# Patient Record
Sex: FEMALE | Race: WHITE | NOT HISPANIC OR LATINO | Employment: UNEMPLOYED | URBAN - NONMETROPOLITAN AREA
[De-identification: names, ages, dates, MRNs, and addresses within clinical notes are randomized per-mention and may not be internally consistent; named-entity substitution may affect disease eponyms.]

---

## 2021-10-20 ENCOUNTER — LAB REQUISITION (OUTPATIENT)
Dept: LAB | Facility: HOSPITAL | Age: 21
End: 2021-10-20

## 2021-10-20 DIAGNOSIS — Z00.00 ROUTINE GENERAL MEDICAL EXAMINATION AT A HEALTH CARE FACILITY: ICD-10-CM

## 2021-10-20 LAB — FIBRONECTIN FETAL VAG QL: NEGATIVE

## 2021-10-20 PROCEDURE — 82731 ASSAY OF FETAL FIBRONECTIN: CPT | Performed by: OBSTETRICS & GYNECOLOGY

## 2023-05-26 LAB
EXTERNAL ABO GROUPING: NORMAL
EXTERNAL ANTIBODY SCREEN: NEGATIVE
EXTERNAL HEPATITIS B SURFACE ANTIGEN: NEGATIVE
EXTERNAL HEPATITIS C AB: NORMAL
EXTERNAL RH FACTOR: POSITIVE
EXTERNAL RUBELLA QUALITATIVE: NORMAL
EXTERNAL SYPHILIS RPR SCREEN: NORMAL
HIV1 P24 AG SERPL QL IA: NORMAL

## 2023-06-03 ENCOUNTER — HOSPITAL ENCOUNTER (INPATIENT)
Facility: HOSPITAL | Age: 23
LOS: 12 days | Discharge: HOME | End: 2023-06-15
Attending: OBSTETRICS & GYNECOLOGY | Admitting: OBSTETRICS & GYNECOLOGY
Payer: MEDICAID

## 2023-06-03 ENCOUNTER — APPOINTMENT (OUTPATIENT)
Dept: ULTRASOUND IMAGING | Facility: HOSPITAL | Age: 23
End: 2023-06-03
Payer: MEDICAID

## 2023-06-03 LAB
ABO GROUP BLD: NORMAL
BASOPHILS # BLD AUTO: 0.04 10*3/MM3 (ref 0–0.2)
BASOPHILS NFR BLD AUTO: 0.2 % (ref 0–1.5)
BLD GP AB SCN SERPL QL: NEGATIVE
BURR CELLS BLD QL SMEAR: NORMAL
CLUMPED PLATELETS: PRESENT
DEPRECATED RDW RBC AUTO: 42.5 FL (ref 37–54)
ELLIPTOCYTES BLD QL SMEAR: NORMAL
EOSINOPHIL # BLD AUTO: 0.02 10*3/MM3 (ref 0–0.4)
EOSINOPHIL NFR BLD AUTO: 0.1 % (ref 0.3–6.2)
ERYTHROCYTE [DISTWIDTH] IN BLOOD BY AUTOMATED COUNT: 15.9 % (ref 12.3–15.4)
HCT VFR BLD AUTO: 27.6 % (ref 34–46.6)
HGB BLD-MCNC: 8.2 G/DL (ref 12–15.9)
IMM GRANULOCYTES # BLD AUTO: 0.28 10*3/MM3 (ref 0–0.05)
IMM GRANULOCYTES NFR BLD AUTO: 1.6 % (ref 0–0.5)
LYMPHOCYTES # BLD AUTO: 1.98 10*3/MM3 (ref 0.7–3.1)
LYMPHOCYTES NFR BLD AUTO: 11.3 % (ref 19.6–45.3)
MCH RBC QN AUTO: 22.1 PG (ref 26.6–33)
MCHC RBC AUTO-ENTMCNC: 29.7 G/DL (ref 31.5–35.7)
MCV RBC AUTO: 74.4 FL (ref 79–97)
MICROCYTES BLD QL: NORMAL
MONOCYTES # BLD AUTO: 0.53 10*3/MM3 (ref 0.1–0.9)
MONOCYTES NFR BLD AUTO: 3 % (ref 5–12)
NEUTROPHILS NFR BLD AUTO: 14.61 10*3/MM3 (ref 1.7–7)
NEUTROPHILS NFR BLD AUTO: 83.8 % (ref 42.7–76)
NRBC BLD AUTO-RTO: 0.3 /100 WBC (ref 0–0.2)
PLATELET # BLD AUTO: 408 10*3/MM3 (ref 140–450)
PMV BLD AUTO: 10.3 FL (ref 6–12)
POLYCHROMASIA BLD QL SMEAR: NORMAL
RBC # BLD AUTO: 3.71 10*6/MM3 (ref 3.77–5.28)
RH BLD: POSITIVE
SMALL PLATELETS BLD QL SMEAR: ADEQUATE
T&S EXPIRATION DATE: NORMAL
WBC MORPH BLD: NORMAL
WBC NRBC COR # BLD: 17.46 10*3/MM3 (ref 3.4–10.8)

## 2023-06-03 PROCEDURE — 86901 BLOOD TYPING SEROLOGIC RH(D): CPT | Performed by: OBSTETRICS & GYNECOLOGY

## 2023-06-03 PROCEDURE — 25010000002 AMPICILLIN PER 500 MG: Performed by: OBSTETRICS & GYNECOLOGY

## 2023-06-03 PROCEDURE — 86900 BLOOD TYPING SEROLOGIC ABO: CPT | Performed by: OBSTETRICS & GYNECOLOGY

## 2023-06-03 PROCEDURE — 85007 BL SMEAR W/DIFF WBC COUNT: CPT | Performed by: OBSTETRICS & GYNECOLOGY

## 2023-06-03 PROCEDURE — 76815 OB US LIMITED FETUS(S): CPT

## 2023-06-03 PROCEDURE — 86850 RBC ANTIBODY SCREEN: CPT | Performed by: OBSTETRICS & GYNECOLOGY

## 2023-06-03 PROCEDURE — 85025 COMPLETE CBC W/AUTO DIFF WBC: CPT | Performed by: OBSTETRICS & GYNECOLOGY

## 2023-06-03 PROCEDURE — 25010000002 AZITHROMYCIN PER 500 MG: Performed by: OBSTETRICS & GYNECOLOGY

## 2023-06-03 RX ORDER — PRENATAL VIT/IRON FUM/FOLIC AC 27MG-0.8MG
1 TABLET ORAL DAILY
COMMUNITY

## 2023-06-03 RX ORDER — SODIUM CHLORIDE, SODIUM LACTATE, POTASSIUM CHLORIDE, CALCIUM CHLORIDE 600; 310; 30; 20 MG/100ML; MG/100ML; MG/100ML; MG/100ML
125 INJECTION, SOLUTION INTRAVENOUS CONTINUOUS
Status: DISCONTINUED | OUTPATIENT
Start: 2023-06-03 | End: 2023-06-13

## 2023-06-03 RX ORDER — BETAMETHASONE SODIUM PHOSPHATE AND BETAMETHASONE ACETATE 3; 3 MG/ML; MG/ML
12 INJECTION, SUSPENSION INTRA-ARTICULAR; INTRALESIONAL; INTRAMUSCULAR; SOFT TISSUE EVERY 24 HOURS
Status: DISPENSED | OUTPATIENT
Start: 2023-06-03 | End: 2023-06-05

## 2023-06-03 RX ADMIN — AZITHROMYCIN 500 MG: 500 INJECTION, POWDER, LYOPHILIZED, FOR SOLUTION INTRAVENOUS at 18:41

## 2023-06-03 RX ADMIN — AMPICILLIN 2 G: 2 INJECTION, POWDER, FOR SOLUTION INTRAMUSCULAR; INTRAVENOUS at 21:36

## 2023-06-03 RX ADMIN — SODIUM CHLORIDE, POTASSIUM CHLORIDE, SODIUM LACTATE AND CALCIUM CHLORIDE 125 ML/HR: 600; 310; 30; 20 INJECTION, SOLUTION INTRAVENOUS at 17:00

## 2023-06-04 ENCOUNTER — APPOINTMENT (OUTPATIENT)
Dept: ULTRASOUND IMAGING | Facility: HOSPITAL | Age: 23
End: 2023-06-04
Payer: MEDICAID

## 2023-06-04 LAB
AMPHET+METHAMPHET UR QL: NEGATIVE
AMPHETAMINES UR QL: NEGATIVE
BARBITURATES UR QL SCN: NEGATIVE
BENZODIAZ UR QL SCN: NEGATIVE
BILIRUB UR QL STRIP: NEGATIVE
BUPRENORPHINE SERPL-MCNC: NEGATIVE NG/ML
CANNABINOIDS SERPL QL: NEGATIVE
CLARITY UR: CLEAR
CLUE CELLS SPEC QL WET PREP: ABNORMAL
COCAINE UR QL: NEGATIVE
COLOR UR: YELLOW
FENTANYL UR-MCNC: NEGATIVE NG/ML
GLUCOSE UR STRIP-MCNC: NEGATIVE MG/DL
HGB UR QL STRIP.AUTO: NEGATIVE
HYDATID CYST SPEC WET PREP: ABNORMAL
KETONES UR QL STRIP: NEGATIVE
LEUKOCYTE ESTERASE UR QL STRIP.AUTO: NEGATIVE
METHADONE UR QL SCN: NEGATIVE
NITRITE UR QL STRIP: NEGATIVE
OPIATES UR QL: NEGATIVE
OXYCODONE UR QL SCN: NEGATIVE
PCP UR QL SCN: NEGATIVE
PH UR STRIP.AUTO: 7 [PH] (ref 5–8)
PROPOXYPH UR QL: NEGATIVE
PROT UR QL STRIP: NEGATIVE
SP GR UR STRIP: 1.01 (ref 1–1.03)
T VAGINALIS SPEC QL WET PREP: ABNORMAL
TRICYCLICS UR QL SCN: NEGATIVE
UROBILINOGEN UR QL STRIP: NORMAL
WBC SPEC QL WET PREP: ABNORMAL
YEAST GENITAL QL WET PREP: ABNORMAL

## 2023-06-04 PROCEDURE — 87210 SMEAR WET MOUNT SALINE/INK: CPT | Performed by: OBSTETRICS & GYNECOLOGY

## 2023-06-04 PROCEDURE — 25010000002 AMPICILLIN PER 500 MG: Performed by: OBSTETRICS & GYNECOLOGY

## 2023-06-04 PROCEDURE — 63710000001 PROMETHAZINE PER 25 MG: Performed by: OBSTETRICS & GYNECOLOGY

## 2023-06-04 PROCEDURE — 80307 DRUG TEST PRSMV CHEM ANLYZR: CPT | Performed by: OBSTETRICS & GYNECOLOGY

## 2023-06-04 PROCEDURE — 76815 OB US LIMITED FETUS(S): CPT

## 2023-06-04 PROCEDURE — 25010000002 BETAMETHASONE ACET & SOD PHOS PER 4 MG: Performed by: OBSTETRICS & GYNECOLOGY

## 2023-06-04 PROCEDURE — 87086 URINE CULTURE/COLONY COUNT: CPT | Performed by: OBSTETRICS & GYNECOLOGY

## 2023-06-04 PROCEDURE — 25010000002 AZITHROMYCIN PER 500 MG: Performed by: OBSTETRICS & GYNECOLOGY

## 2023-06-04 PROCEDURE — 81003 URINALYSIS AUTO W/O SCOPE: CPT | Performed by: OBSTETRICS & GYNECOLOGY

## 2023-06-04 RX ORDER — PROMETHAZINE HYDROCHLORIDE 25 MG/1
12.5 TABLET ORAL ONCE
Status: COMPLETED | OUTPATIENT
Start: 2023-06-04 | End: 2023-06-04

## 2023-06-04 RX ADMIN — AMPICILLIN 2 G: 2 INJECTION, POWDER, FOR SOLUTION INTRAMUSCULAR; INTRAVENOUS at 09:05

## 2023-06-04 RX ADMIN — SODIUM CHLORIDE, POTASSIUM CHLORIDE, SODIUM LACTATE AND CALCIUM CHLORIDE 125 ML/HR: 600; 310; 30; 20 INJECTION, SOLUTION INTRAVENOUS at 18:44

## 2023-06-04 RX ADMIN — AMPICILLIN 2 G: 2 INJECTION, POWDER, FOR SOLUTION INTRAMUSCULAR; INTRAVENOUS at 03:20

## 2023-06-04 RX ADMIN — SODIUM CHLORIDE, POTASSIUM CHLORIDE, SODIUM LACTATE AND CALCIUM CHLORIDE 125 ML/HR: 600; 310; 30; 20 INJECTION, SOLUTION INTRAVENOUS at 07:00

## 2023-06-04 RX ADMIN — AMPICILLIN 2 G: 2 INJECTION, POWDER, FOR SOLUTION INTRAMUSCULAR; INTRAVENOUS at 20:38

## 2023-06-04 RX ADMIN — PROMETHAZINE HYDROCHLORIDE 12.5 MG: 25 TABLET ORAL at 20:37

## 2023-06-04 RX ADMIN — AMPICILLIN 2 G: 2 INJECTION, POWDER, FOR SOLUTION INTRAMUSCULAR; INTRAVENOUS at 14:57

## 2023-06-04 RX ADMIN — AZITHROMYCIN 500 MG: 500 INJECTION, POWDER, LYOPHILIZED, FOR SOLUTION INTRAVENOUS at 18:44

## 2023-06-04 RX ADMIN — BETAMETHASONE SODIUM PHOSPHATE AND BETAMETHASONE ACETATE 12 MG: 3; 3 INJECTION, SUSPENSION INTRA-ARTICULAR; INTRALESIONAL; INTRAMUSCULAR at 16:17

## 2023-06-04 NOTE — NURSING NOTE
Ultrasound tech at bedside to get measurement of ARCELIA, U/S shows AFT to be 7.44, Infant is vertex and fetal heart tones to be 125.

## 2023-06-04 NOTE — PLAN OF CARE
Goal Outcome Evaluation:  Plan of Care Reviewed With: patient        Progress: improving  Outcome Evaluation: [place] [facility] [place] 6/3/2023 at 1210, clear fluid.  Betamethasone x 1 received on 6/3/2023 at 1600, next dose due at 1600 on 6/4/2023.  Patient receiving IV Zithromax Q24 hours and IV Ampicillin 2g Q6 hours.  Patient currently on clear liquid diet.  Patient rating pain 0/10.  Repeat US today to verify ARCELIA.  Patient's significant other at bedside.

## 2023-06-04 NOTE — PROGRESS NOTES
Rosalia Guajardo  : 2000  MRN: [id number]  CSN: [id number]    Labor progress note    Subjective   Transfer from [place] for PPROM at 32 weeks. Minimal prenatal care with only 2 prenatal visits, the first being 1 month ago. Minimal leakage of fluid, ARCELIA last night 11, this morning 7. No evidence of  labor. Discussed with patient and FOB long term plan of care. Explained with PPROM, patient will not be able to leave the floor to smoke, offered a nicotine patch. Begun on prophylactic antibiotics to increase latency period to labor. Will send wet prep to ensure no BV as current antibiotic regimen does not provide coverage for BV. Cath urine collected as well.     Objective   Min/max vitals past 24 hours:  Temp  Min: 97.6 °F (36.4 °C)  Max: 98.5 °F (36.9 °C)   BP  Min: 102/44  Max: 131/57   Pulse  Min: 73  Max: 85   Resp  Min: 14  Max: 18        FHT's: reactive and category 1.  external monitors used   Cervix: was checked (by RN): 2 cm / 80 % / -2   Contractions: none      Assessment   IUP at 32w6d  PPROM  Poor prenatal care  Smoker     Plan   Expectant management  Antibiotic prophylaxis  Steroids  Cath UA and wet prep  UDS  Nicotine patch prn    Brandy Tellez MD  2023  09:11 CDT

## 2023-06-05 LAB — BACTERIA SPEC AEROBE CULT: NO GROWTH

## 2023-06-05 PROCEDURE — 25010000002 AMPICILLIN PER 500 MG: Performed by: OBSTETRICS & GYNECOLOGY

## 2023-06-05 PROCEDURE — 25010000002 AZITHROMYCIN PER 500 MG: Performed by: OBSTETRICS & GYNECOLOGY

## 2023-06-05 PROCEDURE — 25010000002 ONDANSETRON PER 1 MG: Performed by: OBSTETRICS & GYNECOLOGY

## 2023-06-05 RX ORDER — ONDANSETRON 2 MG/ML
4 INJECTION INTRAMUSCULAR; INTRAVENOUS EVERY 6 HOURS PRN
Status: DISCONTINUED | OUTPATIENT
Start: 2023-06-05 | End: 2023-06-13

## 2023-06-05 RX ADMIN — AMPICILLIN 2 G: 2 INJECTION, POWDER, FOR SOLUTION INTRAMUSCULAR; INTRAVENOUS at 02:48

## 2023-06-05 RX ADMIN — SODIUM CHLORIDE, POTASSIUM CHLORIDE, SODIUM LACTATE AND CALCIUM CHLORIDE 125 ML/HR: 600; 310; 30; 20 INJECTION, SOLUTION INTRAVENOUS at 14:52

## 2023-06-05 RX ADMIN — AZITHROMYCIN 500 MG: 500 INJECTION, POWDER, LYOPHILIZED, FOR SOLUTION INTRAVENOUS at 19:06

## 2023-06-05 RX ADMIN — AMPICILLIN SODIUM 2 G: 2 INJECTION, POWDER, FOR SOLUTION INTRAVENOUS at 20:40

## 2023-06-05 RX ADMIN — ONDANSETRON 4 MG: 2 INJECTION INTRAMUSCULAR; INTRAVENOUS at 16:18

## 2023-06-05 NOTE — PLAN OF CARE
Problem: Adult Inpatient Plan of Care  Goal: Plan of Care Review  Outcome: Ongoing, Progressing  Flowsheets  Taken 6/5/2023 1832 by Terry Hernandez, RN  Progress: no change  Plan of Care Reviewed With: patient  Taken 6/5/2023 0405 by Jessica Chin, RN  Outcome Evaluation: PPROM on 6/3/2023 at 1210, clear fluid.  Patient receiving IV Ampicillin 2g F6evdzh and IV Zithromax Q24hrs.  Patient has had bethamethasone x 2 doses.  Patient rating pain 0/10.   Goal Outcome Evaluation:  Plan of Care Reviewed With: patient        Progress: no change

## 2023-06-05 NOTE — NURSING NOTE
Pt crying in the room states she is overwhelmed thinking she will have to stay in the hospital for weeks. Provided comfort and discussed ways to get her mind off being in the bed and hospital until delivery

## 2023-06-05 NOTE — PLAN OF CARE
Goal Outcome Evaluation:  Plan of Care Reviewed With: patient        Progress: improving  Outcome Evaluation: PPROM on 6/3/2023 at 1210, clear fluid.  Patient receiving IV Ampicillin 2g T6wyylj and IV Zithromax Q24hrs.  Patient has had bethamethasone x 2 doses.  Patient rating pain 0/10.

## 2023-06-05 NOTE — CONSULTS
PRENATAL CONSULTATION NOTE     DATE: 2023  MRN: [id number]      Patient Name: Lacey Guajardo  YOB: 2000    Active Problems:    * No active hospital problems. *      Requesting Physician:  LUIS Tellez    EDC: 2023, by Patient Reported    GA: 33w0d    Estimated fetal weight: unknown    Reason for Consultation: potential premature single at 33 weeks gestation    Maternal History: 22 y.o.  with  labor, PPROM since 12pm 6/3/23, tobacco use, limited prenatal care      Medications:  No recently discontinued medications to reconcile    Consult:  mother available for discussion.  We reviewed the fetal and  aspects of the above conditions to include: estimated survival rate, respiratory distress syndrome, transient tachypnea of the , beneficial effects of steroids, early onset of sepsis, apnea of prematurity, anemia of prematurity, feeding difficulty, temperature instability, jaundice, and hypoglycemia    Plan for Resuscitation: full resuscitation      Offered estimation of prognosis of potential length of infant hospitalization.     We will plan to attend delivery when requested. I discussed the importance of providing human milk for all infants, especially premature infants. The patient does plan on providing pumped breast milk and/or nursing when appropriate.    I also reviewed policies and procedures for NICU/ICN admission and reviewed structure and function of [facility] - Neonatology at [facility].    Thank you for allowing us to participate in the care of this patient. [facility] is always available for follow-up consultation as indicated. Please do not hesitate to call for additional questions or issues.    Additional Comments: none    Papito Barber MD  Attending Neonatologist  [facility] - Neonatology  [facility]  23 @ 14:09 CDT    Consult note reviewed and electronically signed on 2023 at 14:09 CDT    INITIAL HOSPITAL CONSULT:   A total of 45 minutes were spent on counseling and coordination of care including discussion with physicians and nursing, and reviewing the findings and recommendations with the patient and her family of which 30 minutes were spent face-to-face with the patient during this encounter.    Thank you for requesting this consult. Please contact the [facility] on-call  Provider for any further questions/concerns, by calling the NICU at extension [phone].        DISCLAIMER:        At [facility], we believe that sharing information builds trust and better relationships. You are receiving this note because you are receiving care at [facility] or recently visited. It is possible you will see health information before a provider has talked with you about it. This kind of information can be easy to misunderstand. To help you fully understand what it means for your health, we urge you to discuss this note with your provider.

## 2023-06-05 NOTE — PROGRESS NOTES
Rosalia Guajardo  : 2000  MRN: [id number]  CSN: [id number]    Labor progress note    Subjective   Minimal leakage since admit. No evidence of infection or  labor     Objective   Min/max vitals past 24 hours:  Temp  Min: 97.1 °F (36.2 °C)  Max: 98.2 °F (36.8 °C)   BP  Min: 98/46  Max: 127/63   Pulse  Min: 61  Max: 91   Resp  Min: 16  Max: 17        FHT's: reassuring and category 1.  external monitors used   Cervix: was not checked. today   Contractions: none      Assessment   IUP at 33w0d  PPROM     Plan   Expectant management    Brandy Tellez MD  2023  08:01 CDT

## 2023-06-06 PROCEDURE — 25010000002 ONDANSETRON PER 1 MG: Performed by: OBSTETRICS & GYNECOLOGY

## 2023-06-06 PROCEDURE — 25010000002 AZITHROMYCIN PER 500 MG: Performed by: OBSTETRICS & GYNECOLOGY

## 2023-06-06 PROCEDURE — 25010000002 AMPICILLIN PER 500 MG: Performed by: OBSTETRICS & GYNECOLOGY

## 2023-06-06 RX ADMIN — AMPICILLIN SODIUM 2 G: 2 INJECTION, POWDER, FOR SOLUTION INTRAVENOUS at 15:48

## 2023-06-06 RX ADMIN — AMPICILLIN SODIUM 2 G: 2 INJECTION, POWDER, FOR SOLUTION INTRAVENOUS at 21:12

## 2023-06-06 RX ADMIN — ONDANSETRON 4 MG: 2 INJECTION INTRAMUSCULAR; INTRAVENOUS at 17:35

## 2023-06-06 RX ADMIN — AZITHROMYCIN DIHYDRATE 500 MG: 500 INJECTION, POWDER, LYOPHILIZED, FOR SOLUTION INTRAVENOUS at 17:54

## 2023-06-06 RX ADMIN — SODIUM CHLORIDE, POTASSIUM CHLORIDE, SODIUM LACTATE AND CALCIUM CHLORIDE 125 ML/HR: 600; 310; 30; 20 INJECTION, SOLUTION INTRAVENOUS at 00:26

## 2023-06-06 RX ADMIN — AMPICILLIN SODIUM 2 G: 2 INJECTION, POWDER, FOR SOLUTION INTRAVENOUS at 02:20

## 2023-06-06 RX ADMIN — AMPICILLIN SODIUM 2 G: 2 INJECTION, POWDER, FOR SOLUTION INTRAVENOUS at 08:32

## 2023-06-06 NOTE — PAYOR COMM NOTE
"REF:   [id number]    [facility]  RYAN,CM  [phone]  OR  FAX  [phone]    Loni Guajardo (22 y.o. Female)       Date of Birth   2000    Social Security Number       Address   [address] [place] [address] [address] [facility] [address]    Home Phone   [phone]    MRN   [id number]       Judaism   Unknown    Marital Status   Single                            Admission Date   6/3/23    Admission Type   Elective    Admitting Provider   Brandy Tellez MD    Attending Provider   Brandy Tellez MD    Department, Room/Bed   [facility] LABOR DELIVERY, L04/1       Discharge Date       Discharge Disposition       Discharge Destination                                 Attending Provider: Brandy Tellez MD    Allergies: No Known Allergies    Isolation: None   Infection: None   Code Status: Not on file    Ht: 162.6 cm (64\")   Wt: 71.7 kg (158 lb)    Admission Cmt: None   Principal Problem: None                  Active Insurance as of 6/3/2023       Primary Coverage       Payor Plan Insurance Group Employer/Plan Group    [place] [id number]       Payor Plan Address Payor Plan Phone Number Payor Plan Fax Number Effective Dates    [employer] [address] [address] [phone]  1/1/2021 - None Entered    [facility] [address]         Subscriber Name Subscriber Birth Date Member ID       LONI GUAJARDO 2000 [id number]                     Emergency Contacts        (Rel.) Home Phone Work Phone Mobile Phone    [employer] (Other) [phone] -- --           6/3/2023   TRANSFER PER EMS FROM  [facility] TO LABOR AND DELIVERY DUE TO PREMATURE     RUPTURE OF MEMBRANES   EDC 7/24/2023    33/1 GESTATIONAL AGE     G-5 P-2       Miladys Sanchez, RN   Registered Nurse  Obstetrics     Nursing Note      Signed     Date of Service: 06/03/23 1705  Creation Time: 06/03/23 1821     Signed         Patient presents to [facility] for " Premature Rupture of Membranes.                Jessica Chin, RN   Registered Nurse  Obstetrics     Plan of Care      Signed     Date of Service: 06/04/23 0444  Creation Time: 06/04/23 0444     Signed         Goal Outcome Evaluation:  Plan of Care Reviewed With: patient  Progress: improving  Outcome Evaluation: [place] [facility] [place] on 6/3/2023 at 1210, clear fluid.  Betamethasone x 1 received on 6/3/2023 at 1600, next dose due at 1600 on 6/4/2023.  Patient receiving IV Zithromax Q24 hours and IV Ampicillin 2g Q6 hours.  Patient currently on clear liquid diet.  Patient rating pain 0/10.  Repeat US today to verify ARCELIA.  Patient's significant other at bedside.                     Miladys Sanchez RN   Registered Nurse  Obstetrics     Nursing Note      Signed     Date of Service: 06/04/23 0742  Creation Time: 06/04/23 0811     Signed         Ultrasound tech at bedside to get measurement of ARCELIA, U/S shows AFT to be 7.44, Infant is vertex and fetal heart tones to be 125.               Jessica Chin RN   Registered Nurse  Obstetrics     Plan of Care      Signed     Date of Service: 06/05/23 0407  Creation Time: 06/05/23 0407     Signed         Goal Outcome Evaluation:  Plan of Care Reviewed With: patient  Progress: improving  Outcome Evaluation: PPROM on 6/3/2023 at 1210, clear fluid.  Patient receiving IV Ampicillin 2g H3adrdx and IV Zithromax Q24hrs.  Patient has had bethamethasone x 2 doses.  Patient rating pain 0/10.                       Terry Hernandez RN   Registered Nurse     Plan of Care      Signed     Date of Service: 06/05/23 1836  Creation Time: 06/05/23 1836     Signed            Problem: Adult Inpatient Plan of Care  Goal: Plan of Care Review  Outcome: Ongoing, Progressing  Flowsheets  Taken 6/5/2023 1832 by Terry Hernandez, RN  Progress: no change  Plan of Care Reviewed With: patient  Taken 6/5/2023 0405 by Jessica Chin RN  Outcome Evaluation: PPROM on 6/3/2023 at 1210, clear fluid.  Patient  receiving IV Ampicillin 2g P7xhwpg and IV Zithromax Q24hrs.  Patient has had bethamethasone x 2 doses.  Patient rating pain 0/10.   Goal Outcome Evaluation:  Plan of Care Reviewed With: patient  Progress: no change                    Erinn Cevallos RN   Registered Nurse  Obstetrics     Plan of Care      Signed     Date of Service: 23  Creation Time: 23     Signed         Goal Outcome Evaluation:  Plan of Care Reviewed With: patient  Progress: no change  Outcome Evaluation:  PPROM on 6/3 clear fluid; currently receiving ampicillin 2g q6h, zithromax 500mg daily; pt remains afebrile; pain 0/10                       Facility-Administered Medications as of 2023   Medication Dose Route Frequency Provider Last Rate Last Admin    [COMPLETED] ampicillin 2 gm IVPB in 100 ml NS (MBP)  2 g Intravenous Q6H Brandy Tellez  mL/hr at 23 0248 2 g at 23 0248    ampicillin 2 gm IVPB in 100 ml NS (MBP)  2 g Intravenous Q6H Brandy Tellez  mL/hr at 23 0832 2 g at 23 0832    [COMPLETED] azithromycin (ZITHROMAX) 500 mg in sodium chloride 0.9 % 250 mL IVPB-VTB  500 mg Intravenous Q24H Brandy Tellez MD   500 mg at 23 1906    azithromycin (ZITHROMAX) 500 mg in sodium chloride 0.9 % 250 mL IVPB-VTB  500 mg Intravenous Q24H Brandy Tellez MD        [] betamethasone acetate-betamethasone sodium phosphate (CELESTONE SOLUSPAN) injection 12 mg  12 mg Intramuscular Q24H Brandy Tellez MD   12 mg at 23 1617    lactated ringers infusion  125 mL/hr Intravenous Continuous Brandy Tellez  mL/hr at 23 0026 125 mL/hr at 23 0026    ondansetron (ZOFRAN) injection 4 mg  4 mg Intravenous Q6H PRN Brandy Tellez MD   4 mg at 23 1618    [COMPLETED] promethazine (PHENERGAN) tablet 12.5 mg  12.5 mg Oral Once Tellez, Brandy Maira, MD   12.5 mg at 23     Orders (last 72 hrs)         Start     Ordered    06/06/23 1800  azithromycin (ZITHROMAX) 500 mg in sodium chloride 0.9 % 250 mL IVPB-VTB  Every 24 Hours         06/05/23 1939 06/05/23 2000  ampicillin 2 gm IVPB in 100 ml NS (MBP)  Every 6 Hours         06/05/23 1939 06/05/23 1555  ondansetron (ZOFRAN) injection 4 mg  Every 6 Hours PRN         06/05/23 1555    06/05/23 1156  Case Management  Consult  Once        Provider:  (Not yet assigned)    06/05/23 1157    06/05/23 1137  Pt can come off the monitors to shower every morning.  Misc Nursing Order (Specify)  Once        Comments: Pt can come off the monitors to shower every morning.    06/05/23 1137    06/05/23 0743  Inpatient Consult to Neonatology  Once        Specialty:  Neonatology  Provider:  (Not yet assigned)    06/05/23 0742    06/05/23 0730  Inpatient Consult to Neonatology  Once,   Status:  Canceled        Specialty:  Neonatology  Provider:  (Not yet assigned)    06/05/23 0731    06/04/23 2030  promethazine (PHENERGAN) tablet 12.5 mg  Once         06/04/23 1938 06/04/23 1031  Diet: Regular/House Diet; Texture: Regular Texture (IDDSI 7); Fluid Consistency: Thin (IDDSI 0)  Diet Effective Now         06/04/23 1030    06/04/23 0940  Urine Culture - Urine, Straight Cath  Once         06/04/23 0939    06/04/23 0932  Fentanyl, Urine - Urine, Clean Catch  Once         06/04/23 0931    06/04/23 0918  Urine Drug Screen - Urine, Clean Catch  Once         06/04/23 0917    06/04/23 0902  Urinalysis With Culture If Indicated - Straight Cath  STAT         06/04/23 0901    06/04/23 0902  Wet Prep, Genital - Swab, Vagina  STAT         06/04/23 0901    06/04/23 0600  Repeat Nitrazine and fern in the morning to confirm positive status.  Misc Nursing Order (Specify)  Once        Comments: Repeat Nitrazine and fern in the morning to confirm positive status.    06/03/23 2235 06/04/23 0600  US Ob Limited 1 + Fetuses  1 Time Imaging         06/03/23 2235 06/03/23 2100   ampicillin 2 gm IVPB in 100 ml NS (MBP)  Every 6 Hours         06/03/23 1737    06/03/23 1912  US Ob Limited 1 + Fetuses  1 Time Imaging         06/03/23 1741 06/03/23 1900  azithromycin (ZITHROMAX) 500 mg in sodium chloride 0.9 % 250 mL IVPB-VTB  Every 24 Hours         06/03/23 1737    06/03/23 1815  lactated ringers infusion  Continuous         06/03/23 1816    06/03/23 1801  Scan Slide  Once         06/03/23 1800    06/03/23 1800  betamethasone acetate-betamethasone sodium phosphate (CELESTONE SOLUSPAN) injection 12 mg  Every 24 Hours         06/03/23 1737    06/03/23 1742  US Ob 14 + Weeks Single or First Gestation  1 Time Imaging,   Status:  Canceled         06/03/23 1741 06/03/23 1737  Diet: Liquid Diets; Clear Liquid; Texture: Regular Texture (IDDSI 7); Fluid Consistency: Thin (IDDSI 0)  Diet Effective Now,   Status:  Canceled         06/03/23 1737    06/03/23 1737  CBC & Differential  STAT         06/03/23 1737    06/03/23 1737  Type & Screen  STAT         06/03/23 1737    06/03/23 1737  US Ob 14 + Weeks Single or First Gestation  1 Time Imaging,   Status:  Canceled         06/03/23 1737    06/03/23 1737  CBC Auto Differential  PROCEDURE ONCE         06/03/23 1737    06/03/23 0000  Antibody Screen        Comments: This is an external result entered through the Results Console.      06/03/23 1724 06/03/23 0000  ABO / Rh        Comments: This is an external result entered through the Results Console.      06/03/23 1724    06/03/23 0000  Hepatitis C Antibody        Comments: This is an external result entered through the Results Console.      06/03/23 1724    06/03/23 0000  Hepatitis B Surface Antigen        Comments: This is an external result entered through the Results Console.      06/03/23 1724    06/03/23 0000  RPR        Comments: This is an external result entered through the Results Console.      06/03/23 1724    06/03/23 0000  Rubella Antibody, IgG        Comments: This is an external result  entered through the Results Console.      23 1724    23 0000  HIV-1 Antibody, EIA        Comments: This is an external result entered through the Results Console.      23 1724    --  Prenatal Vit-Fe Fumarate-FA (prenatal vitamin 27-0.8) 27-0.8 MG tablet tablet  Daily         23 1709                     Physician Progress Notes (last 72 hours)        Brandy Tellez MD at 23 0801          [facility]  [facility] 2000  MRN: [id number]  CSN: [id number]    Labor progress note    Subjective   Minimal leakage since admit. No evidence of infection or  labor    Objective   Min/max vitals past 24 hours:  Temp  Min: 97.1 °F (36.2 °C)  Max: 98.2 °F (36.8 °C)   BP  Min: 98/46  Max: 127/63   Pulse  Min: 61  Max: 91   Resp  Min: 16  Max: 17        FHT's: reassuring and category 1.  external monitors used   Cervix: was not checked. today   Contractions: none     Assessment   IUP at 33w0d  PPROM    Plan   Expectant management    Brandy Tellez MD  2023  08:01 CDT      Electronically signed by Brandy Tellez MD at 23 0802       Brandy Tellez MD at 23 0909          [facility]  Lacey Bennett  : 2000  MRN: [id number]  CSN: [id number]    Labor progress note    Subjective   Transfer from [place] for PPROM at 32 weeks. Minimal prenatal care with only 2 prenatal visits, the first being 1 month ago. Minimal leakage of fluid, ARCELIA last night 11, this morning 7. No evidence of  labor. Discussed with patient and FOB long term plan of care. Explained with PPROM, patient will not be able to leave the floor to smoke, offered a nicotine patch. Begun on prophylactic antibiotics to increase latency period to labor. Will send wet prep to ensure no BV as current antibiotic regimen does not provide coverage for BV. Cath urine collected as well.    Objective   Min/max vitals past 24 hours:  Temp  Min: 97.6 °F (36.4 °C)  Max: 98.5 °F  (36.9 °C)   BP  Min: 102/44  Max: 131/57   Pulse  Min: 73  Max: 85   Resp  Min: 14  Max: 18        FHT's: reactive and category 1.  external monitors used   Cervix: was checked (by RN): 2 cm / 80 % / -2   Contractions: none     Assessment   IUP at 32w6d  PPROM  Poor prenatal care  Smoker    Plan   Expectant management  Antibiotic prophylaxis  Steroids  Cath UA and wet prep  UDS  Nicotine patch prn    Brandy Tellez MD  2023  09:11 CDT      Electronically signed by Brandy Tellez MD at 23 0916          Consult Notes (last 72 hours)        Papito Barber MD at 23 1408        Consult Orders    1. Inpatient Consult to Neonatology [[id number]] ordered by Brandy Tellez MD at 23 0742                    PRENATAL CONSULTATION NOTE     DATE: 2023  MRN: [id number]      Patient Name: Lacey Guajardo  YOB: 2000    Active Problems:    * No active hospital problems. *      Requesting Physician:  LUIS Tellez    EDC: 2023, by Patient Reported    GA: 33w0d    Estimated fetal weight: unknown    Reason for Consultation: potential premature single at 33 weeks gestation    Maternal History: 22 y.o.  with  labor, PPROM since 12pm 6/3/23, tobacco use, limited prenatal care      Medications:  No recently discontinued medications to reconcile    Consult:  mother available for discussion.  We reviewed the fetal and  aspects of the above conditions to include: estimated survival rate, respiratory distress syndrome, transient tachypnea of the , beneficial effects of steroids, early onset of sepsis, apnea of prematurity, anemia of prematurity, feeding difficulty, temperature instability, jaundice, and hypoglycemia    Plan for Resuscitation: full resuscitation      Offered estimation of prognosis of potential length of infant hospitalization.     We will plan to attend delivery when requested. I discussed the importance of providing human milk  for all infants, especially premature infants. The patient does plan on providing pumped breast milk and/or nursing when appropriate.    I also reviewed policies and procedures for NICU/ICN admission and reviewed structure and function of [facility] - Neonatology at [facility].    Thank you for allowing us to participate in the care of this patient. [facility] is always available for follow-up consultation as indicated. Please do not hesitate to call for additional questions or issues.    Additional Comments: none    Papito Barber MD  Attending Neonatologist  [facility] - Neonatology  [facility]  23 @ 14:09 CDT    Consult note reviewed and electronically signed on 2023 at 14:09 CDT    INITIAL HOSPITAL CONSULT:  A total of 45 minutes were spent on counseling and coordination of care including discussion with physicians and nursing, and reviewing the findings and recommendations with the patient and her family of which 30 minutes were spent face-to-face with the patient during this encounter.    Thank you for requesting this consult. Please contact the [facility] on-call  Provider for any further questions/concerns, by calling the NICU at extension [phone].        DISCLAIMER:        At [facility], we believe that sharing information builds trust and better relationships. You are receiving this note because you are receiving care at [facility] or recently visited. It is possible you will see health information before a provider has talked with you about it. This kind of information can be easy to misunderstand. To help you fully understand what it means for your health, we urge you to discuss this note with your provider.             Electronically signed by Papito Barber MD at 23 0570

## 2023-06-06 NOTE — PLAN OF CARE
Goal Outcome Evaluation:  Plan of Care Reviewed With: patient        Progress: no change  Outcome Evaluation:  PPROM on 6/3 clear fluid; currently receiving ampicillin 2g q6h, zithromax 500mg daily; pt remains afebrile; pain 0/10

## 2023-06-06 NOTE — CASE MANAGEMENT/SOCIAL WORK
SW consulted due to PT being concerned that she cannot transport her family back and forth while in the hospital. SW is unable to provide assistance for visitors travel/expenses.

## 2023-06-07 PROCEDURE — 25010000002 AMPICILLIN PER 500 MG: Performed by: OBSTETRICS & GYNECOLOGY

## 2023-06-07 PROCEDURE — 25010000002 AZITHROMYCIN PER 500 MG: Performed by: OBSTETRICS & GYNECOLOGY

## 2023-06-07 RX ADMIN — AMPICILLIN SODIUM 2 G: 2 INJECTION, POWDER, FOR SOLUTION INTRAVENOUS at 15:24

## 2023-06-07 RX ADMIN — AMPICILLIN SODIUM 2 G: 2 INJECTION, POWDER, FOR SOLUTION INTRAVENOUS at 03:07

## 2023-06-07 RX ADMIN — AMPICILLIN SODIUM 2 G: 2 INJECTION, POWDER, FOR SOLUTION INTRAVENOUS at 20:33

## 2023-06-07 RX ADMIN — AMPICILLIN SODIUM 2 G: 2 INJECTION, POWDER, FOR SOLUTION INTRAVENOUS at 08:46

## 2023-06-07 RX ADMIN — AZITHROMYCIN DIHYDRATE 500 MG: 500 INJECTION, POWDER, LYOPHILIZED, FOR SOLUTION INTRAVENOUS at 18:52

## 2023-06-07 RX ADMIN — SODIUM CHLORIDE, POTASSIUM CHLORIDE, SODIUM LACTATE AND CALCIUM CHLORIDE 125 ML/HR: 600; 310; 30; 20 INJECTION, SOLUTION INTRAVENOUS at 01:47

## 2023-06-07 NOTE — NURSING NOTE
[id number] put on wireless monitors.  Patient other children here for visit.  Patient up ambulating around the room with children. Fetal tracing difficult r/t maternal position.

## 2023-06-07 NOTE — PROGRESS NOTES
"[facility]  Obstetric Progress Note    Subjective     Patient:    No changes this morning. Sleeping better.    Objective     Vital Signs Range for the last 24 hours  Temp:  [97.8 °F (36.6 °C)-98.4 °F (36.9 °C)] 98 °F (36.7 °C)   Temp src: Oral   BP: ()/(56-71) 109/64   Heart Rate:  [64-81] 73   Resp:  [16] 16   SpO2:  [99 %] 99 %       Device (Oxygen Therapy): room air           Flowsheet Rows      Flowsheet Row First Filed Value   Admission Height 162.6 cm (64\") Documented at 06/03/2023 1715   Admission Weight 71.7 kg (158 lb) Documented at 06/03/2023 1715            Intake/Output last 24 hours:      Intake/Output Summary (Last 24 hours) at 6/7/2023 0843  Last data filed at 6/7/2023 0145  Gross per 24 hour   Intake 1000 ml   Output --   Net 1000 ml       Intake/Output this shift:    No intake/output data recorded.    Physical Exam:  General: Patient is comfortable   Heart CVS exam: not examined.   Lungs Chest: not examined.     Abdomen Abdominal exam: gravid, appropriate size, nontender.   Extremities Exam of extremities: no pedal edema noted         Cervix: Exam by: Method: sterile exam per RN   Dilation: Cervical Dilation (cm): 2   Effacement: Cervical Effacement: 80%   Station:           Fetal Heart Rate Assessment   Method: Fetal HR Assessment Method: Telemetry/Wireless Fetal HR Monitor, Telemetry/Wireless Uterine Activity Monitor   Beats/min: Fetal HR (beats/min): 130   Baseline: Fetal HR Baseline: normal range   Varibility: Fetal HR Variability: moderate (amplitude range 6 to 25 bpm)   Accels: Fetal HR Accelerations: greater than/equal to 15 bpm, lasting at least 15 seconds   Decels: Fetal HR Decelerations: absent   Tracing Category: Fetal HR Tracing Category: Category I     Uterine Assessment   Method: Method: palpation, per patient report, external tocotransducer   Frequency (min): Contraction Frequency (Minutes): occasional   Ctx Count in 10 min:     Duration:     Intensity: Contraction Intensity: " mild by palpation   Intensity by IUPC:     Resting Tone: Uterine Resting Tone: soft by palpation   Resting Tone by IUPC:     Louisa Units:         Assessment & Plan     PPROM 33 weeks      Assessment:  1.  Intrauterine pregnancy at 33w2d gestation with reactive fetal status.    2.  PPROM  without chorioamnionitis  3.  Obstetrical history significant for is non-contributory.  4.  GBS status: No results found for: STREPGPB    Plan:  1. Expectant management with delivery after 34 weeks  2. Plan of care has been reviewed with patient   3.  Risks, benefits of treatment plan have been discussed.  4.  All questions have been answered.      Brandy Tellez MD  6/7/2023  08:43 CDT

## 2023-06-07 NOTE — CASE MANAGEMENT/SOCIAL WORK
"Spoke with patient to discuss situation.  She lives with her boyfriend and 3 other children, two 4 year olds (one almost 5) and 1 1/2 year old.  She is missing her children and informed that hospital can assist with them visiting one time while here whether it be via gas card or taxi ride here.  She does say there is a vehicle they can use but later during the day as this person works.  She is saying her S/O gets paid \"soon\" to assist with transportation money.  She prefers we arrange taxi for them to see her sooner rather than later on.  Her S/O (father of children) will obviously come with them when they are ready.  Pt provided this SW number to call and inform when she wants this done. She denied further issues at this time.     9556- Patient called saying S/O and children ready to visit her here.  Called [employer] and set up ride back and forth for visit.  Cost is $56.00 one way so total of $112.00 that will be covered via Patient Assistance Program. Number for [employer] when they are ready to head back home is [phone].  "

## 2023-06-08 PROBLEM — Z34.90 PREGNANCY: Status: ACTIVE | Noted: 2023-06-08

## 2023-06-08 LAB
ABO GROUP BLD: NORMAL
BASOPHILS # BLD AUTO: 0.04 10*3/MM3 (ref 0–0.2)
BASOPHILS NFR BLD AUTO: 0.3 % (ref 0–1.5)
BLD GP AB SCN SERPL QL: NEGATIVE
DEPRECATED RDW RBC AUTO: 43.7 FL (ref 37–54)
EOSINOPHIL # BLD AUTO: 0.05 10*3/MM3 (ref 0–0.4)
EOSINOPHIL NFR BLD AUTO: 0.4 % (ref 0.3–6.2)
ERYTHROCYTE [DISTWIDTH] IN BLOOD BY AUTOMATED COUNT: 16.2 % (ref 12.3–15.4)
HCT VFR BLD AUTO: 25.4 % (ref 34–46.6)
HGB BLD-MCNC: 7.4 G/DL (ref 12–15.9)
LYMPHOCYTES # BLD AUTO: 2.59 10*3/MM3 (ref 0.7–3.1)
LYMPHOCYTES NFR BLD AUTO: 22.3 % (ref 19.6–45.3)
MCH RBC QN AUTO: 21.6 PG (ref 26.6–33)
MCHC RBC AUTO-ENTMCNC: 29.1 G/DL (ref 31.5–35.7)
MCV RBC AUTO: 74.1 FL (ref 79–97)
MONOCYTES # BLD AUTO: 0.89 10*3/MM3 (ref 0.1–0.9)
MONOCYTES NFR BLD AUTO: 7.7 % (ref 5–12)
NEUTROPHILS NFR BLD AUTO: 67.3 % (ref 42.7–76)
NEUTROPHILS NFR BLD AUTO: 7.79 10*3/MM3 (ref 1.7–7)
PLATELET # BLD AUTO: 369 10*3/MM3 (ref 140–450)
PMV BLD AUTO: 10.8 FL (ref 6–12)
RBC # BLD AUTO: 3.43 10*6/MM3 (ref 3.77–5.28)
RH BLD: POSITIVE
T&S EXPIRATION DATE: NORMAL
WBC NRBC COR # BLD: 11.59 10*3/MM3 (ref 3.4–10.8)

## 2023-06-08 PROCEDURE — 25010000002 AZITHROMYCIN PER 500 MG: Performed by: OBSTETRICS & GYNECOLOGY

## 2023-06-08 PROCEDURE — 86900 BLOOD TYPING SEROLOGIC ABO: CPT | Performed by: OBSTETRICS & GYNECOLOGY

## 2023-06-08 PROCEDURE — 85025 COMPLETE CBC W/AUTO DIFF WBC: CPT | Performed by: OBSTETRICS & GYNECOLOGY

## 2023-06-08 PROCEDURE — 25010000002 AMPICILLIN PER 500 MG: Performed by: OBSTETRICS & GYNECOLOGY

## 2023-06-08 PROCEDURE — 86901 BLOOD TYPING SEROLOGIC RH(D): CPT | Performed by: OBSTETRICS & GYNECOLOGY

## 2023-06-08 PROCEDURE — 86850 RBC ANTIBODY SCREEN: CPT | Performed by: OBSTETRICS & GYNECOLOGY

## 2023-06-08 RX ADMIN — SODIUM CHLORIDE, POTASSIUM CHLORIDE, SODIUM LACTATE AND CALCIUM CHLORIDE 125 ML/HR: 600; 310; 30; 20 INJECTION, SOLUTION INTRAVENOUS at 02:24

## 2023-06-08 RX ADMIN — SODIUM CHLORIDE, POTASSIUM CHLORIDE, SODIUM LACTATE AND CALCIUM CHLORIDE 125 ML/HR: 600; 310; 30; 20 INJECTION, SOLUTION INTRAVENOUS at 20:12

## 2023-06-08 RX ADMIN — AZITHROMYCIN DIHYDRATE 500 MG: 500 INJECTION, POWDER, LYOPHILIZED, FOR SOLUTION INTRAVENOUS at 18:14

## 2023-06-08 RX ADMIN — AMPICILLIN SODIUM 2 G: 2 INJECTION, POWDER, FOR SOLUTION INTRAVENOUS at 08:39

## 2023-06-08 RX ADMIN — AMPICILLIN SODIUM 2 G: 2 INJECTION, POWDER, FOR SOLUTION INTRAVENOUS at 14:24

## 2023-06-08 RX ADMIN — AMPICILLIN SODIUM 2 G: 2 INJECTION, POWDER, FOR SOLUTION INTRAVENOUS at 02:24

## 2023-06-08 RX ADMIN — AMPICILLIN SODIUM 2 G: 2 INJECTION, POWDER, FOR SOLUTION INTRAVENOUS at 20:10

## 2023-06-08 RX ADMIN — SODIUM CHLORIDE, POTASSIUM CHLORIDE, SODIUM LACTATE AND CALCIUM CHLORIDE 125 ML/HR: 600; 310; 30; 20 INJECTION, SOLUTION INTRAVENOUS at 08:08

## 2023-06-08 NOTE — PLAN OF CARE
Goal Outcome Evaluation:                      Patient afebrile. Type and screen and cbc completed today. No increase in WBC count. Patient is tired of being in her room and missing her family. Showered and linen changed today and stayed most of the day on the wireless monitors for comfort. Denies any pain

## 2023-06-08 NOTE — NURSING NOTE
Patient removed from wireless monitors so that they may charge, and replaced with corded monitors.

## 2023-06-08 NOTE — PROGRESS NOTES
Rosalia Guajardo  : 2000  MRN: [id number]  CSN: [id number]    Labor progress note    Subjective   No complaints with PPROM, no evidence of  labor or infections     Objective   Min/max vitals past 24 hours:  Temp  Min: 97.1 °F (36.2 °C)  Max: 98.4 °F (36.9 °C)   BP  Min: 103/60  Max: 128/65   Pulse  Min: 71  Max: 86   Resp  Min: 16  Max: 18        FHT's: reassuring and category 1.  external monitors used   Cervix: was not checked.   Contractions: none      Assessment   IUP at 33w3d  PPROM     Plan   Expectant management    Brandy Tellez MD  2023  09:52 CDT

## 2023-06-08 NOTE — NURSING NOTE
Patient off monitor from [id number] for shower. Linen changed and wireless monitors placed back on patient.

## 2023-06-09 PROCEDURE — 25010000002 AZITHROMYCIN PER 500 MG: Performed by: OBSTETRICS & GYNECOLOGY

## 2023-06-09 PROCEDURE — 25010000002 AMPICILLIN PER 500 MG: Performed by: OBSTETRICS & GYNECOLOGY

## 2023-06-09 RX ADMIN — AMPICILLIN SODIUM 2 G: 2 INJECTION, POWDER, FOR SOLUTION INTRAVENOUS at 22:18

## 2023-06-09 RX ADMIN — SODIUM CHLORIDE, POTASSIUM CHLORIDE, SODIUM LACTATE AND CALCIUM CHLORIDE 125 ML/HR: 600; 310; 30; 20 INJECTION, SOLUTION INTRAVENOUS at 22:18

## 2023-06-09 RX ADMIN — AMPICILLIN SODIUM 2 G: 2 INJECTION, POWDER, FOR SOLUTION INTRAVENOUS at 08:08

## 2023-06-09 RX ADMIN — AMPICILLIN SODIUM 2 G: 2 INJECTION, POWDER, FOR SOLUTION INTRAVENOUS at 12:42

## 2023-06-09 RX ADMIN — SODIUM CHLORIDE, POTASSIUM CHLORIDE, SODIUM LACTATE AND CALCIUM CHLORIDE 125 ML/HR: 600; 310; 30; 20 INJECTION, SOLUTION INTRAVENOUS at 07:32

## 2023-06-09 RX ADMIN — AMPICILLIN SODIUM 2 G: 2 INJECTION, POWDER, FOR SOLUTION INTRAVENOUS at 02:50

## 2023-06-09 RX ADMIN — AZITHROMYCIN DIHYDRATE 500 MG: 500 INJECTION, POWDER, LYOPHILIZED, FOR SOLUTION INTRAVENOUS at 18:07

## 2023-06-09 RX ADMIN — AMPICILLIN SODIUM 2 G: 2 INJECTION, POWDER, FOR SOLUTION INTRAVENOUS at 16:41

## 2023-06-10 PROCEDURE — 25010000002 AZITHROMYCIN PER 500 MG: Performed by: OBSTETRICS & GYNECOLOGY

## 2023-06-10 PROCEDURE — 25010000002 AMPICILLIN PER 500 MG: Performed by: OBSTETRICS & GYNECOLOGY

## 2023-06-10 RX ADMIN — AMPICILLIN SODIUM 2 G: 2 INJECTION, POWDER, FOR SOLUTION INTRAVENOUS at 22:28

## 2023-06-10 RX ADMIN — AMPICILLIN SODIUM 2 G: 2 INJECTION, POWDER, FOR SOLUTION INTRAVENOUS at 10:43

## 2023-06-10 RX ADMIN — SODIUM CHLORIDE, POTASSIUM CHLORIDE, SODIUM LACTATE AND CALCIUM CHLORIDE 125 ML/HR: 600; 310; 30; 20 INJECTION, SOLUTION INTRAVENOUS at 05:54

## 2023-06-10 RX ADMIN — AZITHROMYCIN DIHYDRATE 500 MG: 500 INJECTION, POWDER, LYOPHILIZED, FOR SOLUTION INTRAVENOUS at 18:25

## 2023-06-10 RX ADMIN — AMPICILLIN SODIUM 2 G: 2 INJECTION, POWDER, FOR SOLUTION INTRAVENOUS at 04:28

## 2023-06-10 RX ADMIN — AMPICILLIN SODIUM 2 G: 2 INJECTION, POWDER, FOR SOLUTION INTRAVENOUS at 16:00

## 2023-06-10 RX ADMIN — SODIUM CHLORIDE, POTASSIUM CHLORIDE, SODIUM LACTATE AND CALCIUM CHLORIDE 125 ML/HR: 600; 310; 30; 20 INJECTION, SOLUTION INTRAVENOUS at 15:34

## 2023-06-10 NOTE — PLAN OF CARE
Goal Outcome Evaluation:  Plan of Care Reviewed With: patient        Progress: no change  Outcome Evaluation: , 33w5d, PROM on 6/3 with clear fluid, LR infusing at 125 mL/hr, Ampicillin 2g q6h and Zithromax 500mg q24h, pain 0/10, patient remains afebrile

## 2023-06-10 NOTE — PLAN OF CARE
Problem: Adult Inpatient Plan of Care  Goal: Plan of Care Review  Outcome: Ongoing, Progressing  Flowsheets (Taken 6/10/2023 1653)  Progress: no change  Plan of Care Reviewed With: patient  Outcome Evaluation: . pprom on 6/3 with clear fluid. LR currently infusing. continue ampicillin q6h and zithromax q24h. pain 0/10  Goal: Patient-Specific Goal (Individualized)  Outcome: Ongoing, Progressing  Goal: Absence of Hospital-Acquired Illness or Injury  Outcome: Ongoing, Progressing  Intervention: Identify and Manage Fall Risk  Recent Flowsheet Documentation  Taken 6/10/2023 0730 by Ilana Tovar RN  Safety Promotion/Fall Prevention: safety round/check completed  Intervention: Prevent Skin Injury  Recent Flowsheet Documentation  Taken 6/10/2023 0730 by Ilana Tovar RN  Body Position: supine  Goal: Optimal Comfort and Wellbeing  Outcome: Ongoing, Progressing  Intervention: Provide Person-Centered Care  Recent Flowsheet Documentation  Taken 6/10/2023 0730 by Ilana Tovar RN  Trust Relationship/Rapport: care explained  Goal: Readiness for Transition of Care  Outcome: Ongoing, Progressing   Goal Outcome Evaluation:  Plan of Care Reviewed With: patient        Progress: no change  Outcome Evaluation: . pprom on 6/3 with clear fluid. LR currently infusing. continue ampicillin q6h and zithromax q24h. pain 0/10

## 2023-06-11 LAB
ABO GROUP BLD: NORMAL
BASOPHILS # BLD AUTO: 0.06 10*3/MM3 (ref 0–0.2)
BASOPHILS NFR BLD AUTO: 0.5 % (ref 0–1.5)
BLD GP AB SCN SERPL QL: NEGATIVE
DEPRECATED RDW RBC AUTO: 42.8 FL (ref 37–54)
EOSINOPHIL # BLD AUTO: 0.1 10*3/MM3 (ref 0–0.4)
EOSINOPHIL NFR BLD AUTO: 0.8 % (ref 0.3–6.2)
ERYTHROCYTE [DISTWIDTH] IN BLOOD BY AUTOMATED COUNT: 16.8 % (ref 12.3–15.4)
HCT VFR BLD AUTO: 26.5 % (ref 34–46.6)
HGB BLD-MCNC: 7.9 G/DL (ref 12–15.9)
LYMPHOCYTES # BLD AUTO: 2.84 10*3/MM3 (ref 0.7–3.1)
LYMPHOCYTES NFR BLD AUTO: 23.6 % (ref 19.6–45.3)
MCH RBC QN AUTO: 21.8 PG (ref 26.6–33)
MCHC RBC AUTO-ENTMCNC: 29.8 G/DL (ref 31.5–35.7)
MCV RBC AUTO: 73 FL (ref 79–97)
MONOCYTES # BLD AUTO: 1.06 10*3/MM3 (ref 0.1–0.9)
MONOCYTES NFR BLD AUTO: 8.8 % (ref 5–12)
NEUTROPHILS NFR BLD AUTO: 62.3 % (ref 42.7–76)
NEUTROPHILS NFR BLD AUTO: 7.5 10*3/MM3 (ref 1.7–7)
PLATELET # BLD AUTO: 404 10*3/MM3 (ref 140–450)
PMV BLD AUTO: 10.7 FL (ref 6–12)
RBC # BLD AUTO: 3.63 10*6/MM3 (ref 3.77–5.28)
RH BLD: POSITIVE
T&S EXPIRATION DATE: NORMAL
WBC NRBC COR # BLD: 12.04 10*3/MM3 (ref 3.4–10.8)

## 2023-06-11 PROCEDURE — 86900 BLOOD TYPING SEROLOGIC ABO: CPT | Performed by: OBSTETRICS & GYNECOLOGY

## 2023-06-11 PROCEDURE — 85025 COMPLETE CBC W/AUTO DIFF WBC: CPT | Performed by: OBSTETRICS & GYNECOLOGY

## 2023-06-11 PROCEDURE — 25010000002 AZITHROMYCIN PER 500 MG: Performed by: OBSTETRICS & GYNECOLOGY

## 2023-06-11 PROCEDURE — 86901 BLOOD TYPING SEROLOGIC RH(D): CPT | Performed by: OBSTETRICS & GYNECOLOGY

## 2023-06-11 PROCEDURE — 86850 RBC ANTIBODY SCREEN: CPT | Performed by: OBSTETRICS & GYNECOLOGY

## 2023-06-11 PROCEDURE — 25010000002 AMPICILLIN PER 500 MG: Performed by: OBSTETRICS & GYNECOLOGY

## 2023-06-11 RX ORDER — OXYTOCIN/0.9 % SODIUM CHLORIDE 30/500 ML
2-20 PLASTIC BAG, INJECTION (ML) INTRAVENOUS
Status: DISCONTINUED | OUTPATIENT
Start: 2023-06-12 | End: 2023-06-12

## 2023-06-11 RX ORDER — SODIUM CHLORIDE, SODIUM LACTATE, POTASSIUM CHLORIDE, CALCIUM CHLORIDE 600; 310; 30; 20 MG/100ML; MG/100ML; MG/100ML; MG/100ML
125 INJECTION, SOLUTION INTRAVENOUS CONTINUOUS
Status: DISCONTINUED | OUTPATIENT
Start: 2023-06-12 | End: 2023-06-13

## 2023-06-11 RX ORDER — TERBUTALINE SULFATE 1 MG/ML
0.25 INJECTION, SOLUTION SUBCUTANEOUS AS NEEDED
Status: DISCONTINUED | OUTPATIENT
Start: 2023-06-11 | End: 2023-06-13 | Stop reason: HOSPADM

## 2023-06-11 RX ORDER — SODIUM CHLORIDE 0.9 % (FLUSH) 0.9 %
10 SYRINGE (ML) INJECTION EVERY 12 HOURS SCHEDULED
Status: DISCONTINUED | OUTPATIENT
Start: 2023-06-12 | End: 2023-06-13 | Stop reason: HOSPADM

## 2023-06-11 RX ORDER — ONDANSETRON 4 MG/1
4 TABLET, FILM COATED ORAL EVERY 6 HOURS PRN
Status: DISCONTINUED | OUTPATIENT
Start: 2023-06-11 | End: 2023-06-13 | Stop reason: HOSPADM

## 2023-06-11 RX ORDER — PENICILLIN G 3000000 [IU]/50ML
3 INJECTION, SOLUTION INTRAVENOUS EVERY 4 HOURS
Status: DISCONTINUED | OUTPATIENT
Start: 2023-06-12 | End: 2023-06-13 | Stop reason: HOSPADM

## 2023-06-11 RX ORDER — PROMETHAZINE HYDROCHLORIDE 25 MG/1
12.5 TABLET ORAL EVERY 6 HOURS PRN
Status: DISCONTINUED | OUTPATIENT
Start: 2023-06-11 | End: 2023-06-13 | Stop reason: HOSPADM

## 2023-06-11 RX ORDER — LIDOCAINE HYDROCHLORIDE 10 MG/ML
5 INJECTION, SOLUTION EPIDURAL; INFILTRATION; INTRACAUDAL; PERINEURAL AS NEEDED
Status: DISCONTINUED | OUTPATIENT
Start: 2023-06-11 | End: 2023-06-13 | Stop reason: HOSPADM

## 2023-06-11 RX ORDER — ONDANSETRON 2 MG/ML
4 INJECTION INTRAMUSCULAR; INTRAVENOUS EVERY 6 HOURS PRN
Status: DISCONTINUED | OUTPATIENT
Start: 2023-06-11 | End: 2023-06-13 | Stop reason: HOSPADM

## 2023-06-11 RX ORDER — PROMETHAZINE HYDROCHLORIDE 12.5 MG/1
12.5 SUPPOSITORY RECTAL EVERY 6 HOURS PRN
Status: DISCONTINUED | OUTPATIENT
Start: 2023-06-11 | End: 2023-06-13 | Stop reason: HOSPADM

## 2023-06-11 RX ORDER — ACETAMINOPHEN 325 MG/1
650 TABLET ORAL EVERY 4 HOURS PRN
Status: DISCONTINUED | OUTPATIENT
Start: 2023-06-11 | End: 2023-06-13 | Stop reason: HOSPADM

## 2023-06-11 RX ORDER — SODIUM CHLORIDE 0.9 % (FLUSH) 0.9 %
10 SYRINGE (ML) INJECTION AS NEEDED
Status: DISCONTINUED | OUTPATIENT
Start: 2023-06-11 | End: 2023-06-13 | Stop reason: HOSPADM

## 2023-06-11 RX ORDER — BUTORPHANOL TARTRATE 1 MG/ML
1 INJECTION, SOLUTION INTRAMUSCULAR; INTRAVENOUS
Status: DISCONTINUED | OUTPATIENT
Start: 2023-06-12 | End: 2023-06-13 | Stop reason: HOSPADM

## 2023-06-11 RX ADMIN — AZITHROMYCIN DIHYDRATE 500 MG: 500 INJECTION, POWDER, LYOPHILIZED, FOR SOLUTION INTRAVENOUS at 18:12

## 2023-06-11 RX ADMIN — AMPICILLIN SODIUM 2 G: 2 INJECTION, POWDER, FOR SOLUTION INTRAVENOUS at 16:09

## 2023-06-11 RX ADMIN — SODIUM CHLORIDE, POTASSIUM CHLORIDE, SODIUM LACTATE AND CALCIUM CHLORIDE 125 ML/HR: 600; 310; 30; 20 INJECTION, SOLUTION INTRAVENOUS at 18:17

## 2023-06-11 RX ADMIN — AMPICILLIN SODIUM 2 G: 2 INJECTION, POWDER, FOR SOLUTION INTRAVENOUS at 10:05

## 2023-06-11 RX ADMIN — AMPICILLIN SODIUM 2 G: 2 INJECTION, POWDER, FOR SOLUTION INTRAVENOUS at 04:28

## 2023-06-11 RX ADMIN — SODIUM CHLORIDE, POTASSIUM CHLORIDE, SODIUM LACTATE AND CALCIUM CHLORIDE 125 ML/HR: 600; 310; 30; 20 INJECTION, SOLUTION INTRAVENOUS at 04:28

## 2023-06-11 RX ADMIN — AMPICILLIN SODIUM 2 G: 2 INJECTION, POWDER, FOR SOLUTION INTRAVENOUS at 22:45

## 2023-06-11 NOTE — PLAN OF CARE
Problem: Adult Inpatient Plan of Care  Goal: Plan of Care Review  Outcome: Ongoing, Progressing  Flowsheets  Taken 2023 1509 by Ilana Tovar, RN  Progress: no change  Plan of Care Reviewed With: patient  Taken 2023 0608 by Genesis Womack, RN  Outcome Evaluation:  33w6d, PPROM on 6/3 with clear fluid, LR infusing at 125 mL/hr, Ampicillin q6h and Zithromax q24h, pain 0/10  Goal: Patient-Specific Goal (Individualized)  Outcome: Ongoing, Progressing  Goal: Absence of Hospital-Acquired Illness or Injury  Outcome: Ongoing, Progressing  Goal: Optimal Comfort and Wellbeing  Outcome: Ongoing, Progressing  Goal: Readiness for Transition of Care  Outcome: Ongoing, Progressing   Goal Outcome Evaluation:  Plan of Care Reviewed With: patient        Progress: no change  Outcome Evaluation: . pprom on 6/3 with clear fluid. LR currently infusing. continue ampicillin q6h and zithromax q24h. pain 0/10

## 2023-06-11 NOTE — PLAN OF CARE
Problem: Adult Inpatient Plan of Care  Goal: Plan of Care Review  Outcome: Ongoing, Progressing  Flowsheets (Taken 2023 0608)  Progress: no change  Plan of Care Reviewed With: patient  Outcome Evaluation:  33w6d, PPROM on 6/3 with clear fluid, LR infusing at 125 mL/hr, Ampicillin q6h and Zithromax q24h, pain 0/10   Goal Outcome Evaluation:  Plan of Care Reviewed With: patient        Progress: no change  Outcome Evaluation:  33w6d, PPROM on 6/3 with clear fluid, LR infusing at 125 mL/hr, Ampicillin q6h and Zithromax q24h, pain 0/10

## 2023-06-12 ENCOUNTER — ANESTHESIA EVENT (OUTPATIENT)
Dept: LABOR AND DELIVERY | Facility: HOSPITAL | Age: 23
End: 2023-06-12
Payer: MEDICAID

## 2023-06-12 ENCOUNTER — ANESTHESIA (OUTPATIENT)
Dept: LABOR AND DELIVERY | Facility: HOSPITAL | Age: 23
End: 2023-06-12
Payer: MEDICAID

## 2023-06-12 PROCEDURE — 25010000002 ROPIVACAINE PER 1 MG

## 2023-06-12 PROCEDURE — 25010000002 PENICILLIN G POTASSIUM PER 600000 UNITS: Performed by: OBSTETRICS & GYNECOLOGY

## 2023-06-12 PROCEDURE — 25010000002 AZITHROMYCIN PER 500 MG: Performed by: OBSTETRICS & GYNECOLOGY

## 2023-06-12 PROCEDURE — C1755 CATHETER, INTRASPINAL: HCPCS

## 2023-06-12 PROCEDURE — 51702 INSERT TEMP BLADDER CATH: CPT

## 2023-06-12 RX ORDER — LIDOCAINE HYDROCHLORIDE AND EPINEPHRINE 15; 5 MG/ML; UG/ML
INJECTION, SOLUTION EPIDURAL
Status: COMPLETED | OUTPATIENT
Start: 2023-06-12 | End: 2023-06-12

## 2023-06-12 RX ORDER — ROPIVACAINE HYDROCHLORIDE 2 MG/ML
INJECTION, SOLUTION EPIDURAL; INFILTRATION; PERINEURAL AS NEEDED
Status: DISCONTINUED | OUTPATIENT
Start: 2023-06-12 | End: 2023-06-13 | Stop reason: SURG

## 2023-06-12 RX ORDER — OXYTOCIN/0.9 % SODIUM CHLORIDE 30/500 ML
2-20 PLASTIC BAG, INJECTION (ML) INTRAVENOUS
Status: DISCONTINUED | OUTPATIENT
Start: 2023-06-13 | End: 2023-06-13

## 2023-06-12 RX ADMIN — SODIUM CHLORIDE, POTASSIUM CHLORIDE, SODIUM LACTATE AND CALCIUM CHLORIDE 1000 ML: 600; 310; 30; 20 INJECTION, SOLUTION INTRAVENOUS at 16:14

## 2023-06-12 RX ADMIN — PENICILLIN G 3 MILLION UNITS: 3000000 INJECTION, SOLUTION INTRAVENOUS at 14:44

## 2023-06-12 RX ADMIN — PENICILLIN G 3 MILLION UNITS: 3000000 INJECTION, SOLUTION INTRAVENOUS at 10:26

## 2023-06-12 RX ADMIN — Medication 2 MILLI-UNITS/MIN: at 06:00

## 2023-06-12 RX ADMIN — PENICILLIN G 3 MILLION UNITS: 3000000 INJECTION, SOLUTION INTRAVENOUS at 19:56

## 2023-06-12 RX ADMIN — AZITHROMYCIN DIHYDRATE 500 MG: 500 INJECTION, POWDER, LYOPHILIZED, FOR SOLUTION INTRAVENOUS at 18:44

## 2023-06-12 RX ADMIN — ROPIVACAINE HYDROCHLORIDE 8 ML: 2 INJECTION, SOLUTION EPIDURAL; INFILTRATION at 16:51

## 2023-06-12 RX ADMIN — PENICILLIN G 3 MILLION UNITS: 3000000 INJECTION, SOLUTION INTRAVENOUS at 23:49

## 2023-06-12 RX ADMIN — SODIUM CHLORIDE, POTASSIUM CHLORIDE, SODIUM LACTATE AND CALCIUM CHLORIDE 125 ML/HR: 600; 310; 30; 20 INJECTION, SOLUTION INTRAVENOUS at 17:26

## 2023-06-12 RX ADMIN — ROPIVACAINE HYDROCHLORIDE 4 ML/HR: 2 INJECTION, SOLUTION EPIDURAL; INFILTRATION at 16:55

## 2023-06-12 RX ADMIN — SODIUM CHLORIDE 5 MILLION UNITS: 900 INJECTION INTRAVENOUS at 06:05

## 2023-06-12 RX ADMIN — LIDOCAINE HYDROCHLORIDE AND EPINEPHRINE 3 ML: 15; 5 INJECTION, SOLUTION EPIDURAL at 16:46

## 2023-06-12 RX ADMIN — SODIUM CHLORIDE, POTASSIUM CHLORIDE, SODIUM LACTATE AND CALCIUM CHLORIDE 125 ML/HR: 600; 310; 30; 20 INJECTION, SOLUTION INTRAVENOUS at 07:42

## 2023-06-12 NOTE — NURSING NOTE
C/O pressure and request for SVE.  Bloody show and 4cm clot noted on pad.FHR reassuring.  Abd soft.  Dr. Tellez notified.

## 2023-06-12 NOTE — ANESTHESIA PREPROCEDURE EVALUATION
Anesthesia Evaluation     Patient summary reviewed and Nursing notes reviewed   no history of anesthetic complications:                Airway   Mallampati: II  TM distance: <3 FB  Neck ROM: full  No difficulty expected  Dental      Pulmonary    (-) asthma, not a smoker  Cardiovascular     ECG reviewed        Neuro/Psych  (-) seizures  GI/Hepatic/Renal/Endo      Musculoskeletal     Abdominal    Substance History      OB/GYN    (+) Pregnant        Other                      Anesthesia Plan    ASA 2     epidural       Anesthetic plan, risks, benefits, and alternatives have been provided, discussed and informed consent has been obtained with: patient.    Plan discussed with CRNA.    CODE STATUS:

## 2023-06-12 NOTE — PLAN OF CARE
Goal Outcome Evaluation:  Plan of Care Reviewed With: patient        Progress: improving  Outcome Evaluation:  34 wk PPROM induction of labor. Pitocin @ 20 mu/min, LR@125cc/hr. Epidural infusing. Beckwith catheter draining clear yeloow urine. Currently 3cm, 70-80, -2.

## 2023-06-12 NOTE — PROGRESS NOTES
Rosalia Guajardo  : 2000  MRN: [id number]  CSN: [id number]    Labor progress note    Subjective      Patient without complaints - ordered repeat CBC no evidence of infection. No contractions or  labor      Objective   Min/max vitals past 24 hours:  Temp  Min: 97.8 °F (36.6 °C)  Max: 98.4 °F (36.9 °C)   BP  Min: 103/66  Max: 128/60   Pulse  Min: 62  Max: 110   Resp  Min: 16  Max: 18        FHT's: reassuring and category 1.  external monitors used   Cervix: was not checked.   Contractions: none      Assessment   1. IUP at 33w4d  2. PPROM     Plan   1. Expectant management    Brandy Tellez MD  2023  11:16 CDT

## 2023-06-12 NOTE — PLAN OF CARE
Goal Outcome Evaluation:      Pitocin induction started this a.m. Pt denies any pain at present, she is aware of pain med availability and wants an epidural once labor advances. PCN G started for unknown group beta strep status.

## 2023-06-12 NOTE — NURSING NOTE
Roseann from  contacted and transportation needs communicated to her.  She will visit pt after baby is born and set up transportation help and also resources for family after pt and baby are discharged.

## 2023-06-12 NOTE — CASE MANAGEMENT/SOCIAL WORK
SW consulted for assistance with transportation. ROBERTO has spoken with MARK Pulliam who has advised that mother is being induced and expected to deliver today. SW advised that once baby is born and admitted to NICU, we will have more resources available to assist with transportation and other things. SW will meet with family once baby is in NICU and mom stable/alert after delivery. RN to inform family that SW will meet with them after baby is born to assist and review available resources.

## 2023-06-12 NOTE — PROGRESS NOTES
Rosalia Guajardo  : 2000  MRN: [id number]  CSN: [id number]    Labor progress note    Subjective   Late entry - computers down. Patient seen without complaints.     Objective   Min/max vitals past 24 hours:  Temp  Min: 97.8 °F (36.6 °C)  Max: 98.4 °F (36.9 °C)   BP  Min: 103/66  Max: 128/60   Pulse  Min: 62  Max: 110   Resp  Min: 16  Max: 18        FHT's: reassuring and category 1.  external monitors used   Cervix: was not checked.   Contractions: none      Assessment   1. IUP at 33w5d  2. PPROM     Plan   1. Expectant management    Brandy Tellez MD  2023  20:19 CDT

## 2023-06-12 NOTE — PROGRESS NOTES
Rosalia Guajardo  : 2000  MRN: [id number]  CSN: [id number]    Labor progress note    Subjective   Pt with PPROM post steroids. Induction planned for tomorrow     Objective   Min/max vitals past 24 hours:  Temp  Min: 97.8 °F (36.6 °C)  Max: 98.4 °F (36.9 °C)   BP  Min: 103/66  Max: 128/60   Pulse  Min: 62  Max: 110   Resp  Min: 16  Max: 18        FHT's: reassuring and category 1.  external monitors used   Cervix: was not checked.   Contractions: none      Assessment   1. IUP at 33w6d  2. PPROM     Plan   1. Expectant management   2. Induction in the morning    Brandy Tellez MD  2023  20:22 CDT

## 2023-06-12 NOTE — ANESTHESIA PROCEDURE NOTES
Labor Epidural      Patient reassessed immediately prior to procedure    Patient location during procedure: OB  Performed By  CRNA/CAA: Rick Summers CRNA  Preanesthetic Checklist  Completed: patient identified, IV checked, site marked, risks and benefits discussed, surgical consent, monitors and equipment checked, pre-op evaluation and timeout performed  Prep:  Pt Position:sitting  Sterile Tech:cap, gloves, gown, mask and sterile barrier  Prep:chlorhexidine gluconate and isopropyl alcohol  Monitoring:blood pressure monitoring, continuous pulse oximetry and EKG  Epidural Block Procedure:  Approach:midline  Guidance:landmark technique  Location:L3-L4  Needle Type:Tuohy  Needle Gauge:17 G  Loss of Resistance Medium: air  Loss of Resistance: 8cm  Cath Depth at skin:12 cm  Paresthesia: none  Aspiration:negative  Test Dose:negative  Medication: lidocaine 1.5%-EPINEPHrine 1:200,000 (XYLOCAINE W/EPI) injection - Injection   3 mL - 6/12/2023 4:46:00 PM  Number of Attempts: 1  Post Assessment:  Dressing:occlusive dressing applied and secured with tape  Pt Tolerance:patient tolerated the procedure well with no apparent complications  Complications:no

## 2023-06-13 PROBLEM — Z34.90 PREGNANCY: Status: RESOLVED | Noted: 2023-06-08 | Resolved: 2023-06-13

## 2023-06-13 PROCEDURE — 88307 TISSUE EXAM BY PATHOLOGIST: CPT | Performed by: OBSTETRICS & GYNECOLOGY

## 2023-06-13 PROCEDURE — 25010000002 PENICILLIN G POTASSIUM PER 600000 UNITS: Performed by: OBSTETRICS & GYNECOLOGY

## 2023-06-13 PROCEDURE — 25010000002 BUTORPHANOL PER 1 MG: Performed by: OBSTETRICS & GYNECOLOGY

## 2023-06-13 RX ORDER — PROMETHAZINE HYDROCHLORIDE 25 MG/1
12.5 TABLET ORAL EVERY 6 HOURS PRN
Status: DISCONTINUED | OUTPATIENT
Start: 2023-06-13 | End: 2023-06-13 | Stop reason: SDUPTHER

## 2023-06-13 RX ORDER — OXYTOCIN/0.9 % SODIUM CHLORIDE 30/500 ML
250 PLASTIC BAG, INJECTION (ML) INTRAVENOUS CONTINUOUS
Status: ACTIVE | OUTPATIENT
Start: 2023-06-13 | End: 2023-06-13

## 2023-06-13 RX ORDER — TRAMADOL HYDROCHLORIDE 50 MG/1
50 TABLET ORAL EVERY 6 HOURS PRN
Status: DISCONTINUED | OUTPATIENT
Start: 2023-06-13 | End: 2023-06-15 | Stop reason: HOSPADM

## 2023-06-13 RX ORDER — CARBOPROST TROMETHAMINE 250 UG/ML
250 INJECTION, SOLUTION INTRAMUSCULAR
Status: DISCONTINUED | OUTPATIENT
Start: 2023-06-13 | End: 2023-06-13 | Stop reason: HOSPADM

## 2023-06-13 RX ORDER — MISOPROSTOL 200 UG/1
600 TABLET ORAL ONCE AS NEEDED
Status: DISCONTINUED | OUTPATIENT
Start: 2023-06-13 | End: 2023-06-15 | Stop reason: HOSPADM

## 2023-06-13 RX ORDER — PRENATAL VIT/IRON FUM/FOLIC AC 27MG-0.8MG
1 TABLET ORAL DAILY
Status: DISCONTINUED | OUTPATIENT
Start: 2023-06-13 | End: 2023-06-15 | Stop reason: HOSPADM

## 2023-06-13 RX ORDER — MISOPROSTOL 200 UG/1
800 TABLET ORAL ONCE AS NEEDED
Status: DISCONTINUED | OUTPATIENT
Start: 2023-06-13 | End: 2023-06-13 | Stop reason: HOSPADM

## 2023-06-13 RX ORDER — PROMETHAZINE HYDROCHLORIDE 12.5 MG/1
12.5 SUPPOSITORY RECTAL EVERY 6 HOURS PRN
Status: DISCONTINUED | OUTPATIENT
Start: 2023-06-13 | End: 2023-06-13 | Stop reason: SDUPTHER

## 2023-06-13 RX ORDER — BISACODYL 10 MG
10 SUPPOSITORY, RECTAL RECTAL DAILY PRN
Status: DISCONTINUED | OUTPATIENT
Start: 2023-06-14 | End: 2023-06-15 | Stop reason: HOSPADM

## 2023-06-13 RX ORDER — PROMETHAZINE HYDROCHLORIDE 12.5 MG/1
12.5 SUPPOSITORY RECTAL EVERY 6 HOURS PRN
Status: DISCONTINUED | OUTPATIENT
Start: 2023-06-13 | End: 2023-06-15 | Stop reason: HOSPADM

## 2023-06-13 RX ORDER — ONDANSETRON 2 MG/ML
4 INJECTION INTRAMUSCULAR; INTRAVENOUS EVERY 6 HOURS PRN
Status: DISCONTINUED | OUTPATIENT
Start: 2023-06-13 | End: 2023-06-13 | Stop reason: SDUPTHER

## 2023-06-13 RX ORDER — OXYTOCIN/0.9 % SODIUM CHLORIDE 30/500 ML
999 PLASTIC BAG, INJECTION (ML) INTRAVENOUS ONCE
Status: DISCONTINUED | OUTPATIENT
Start: 2023-06-13 | End: 2023-06-13 | Stop reason: HOSPADM

## 2023-06-13 RX ORDER — CARBOPROST TROMETHAMINE 250 UG/ML
250 INJECTION, SOLUTION INTRAMUSCULAR ONCE AS NEEDED
Status: DISCONTINUED | OUTPATIENT
Start: 2023-06-13 | End: 2023-06-15 | Stop reason: HOSPADM

## 2023-06-13 RX ORDER — SODIUM CHLORIDE 0.9 % (FLUSH) 0.9 %
1-10 SYRINGE (ML) INJECTION AS NEEDED
Status: DISCONTINUED | OUTPATIENT
Start: 2023-06-13 | End: 2023-06-15 | Stop reason: HOSPADM

## 2023-06-13 RX ORDER — DOCUSATE SODIUM 100 MG/1
100 CAPSULE, LIQUID FILLED ORAL 2 TIMES DAILY
Status: DISCONTINUED | OUTPATIENT
Start: 2023-06-13 | End: 2023-06-15 | Stop reason: HOSPADM

## 2023-06-13 RX ORDER — ONDANSETRON 4 MG/1
4 TABLET, FILM COATED ORAL EVERY 6 HOURS PRN
Status: DISCONTINUED | OUTPATIENT
Start: 2023-06-13 | End: 2023-06-15 | Stop reason: HOSPADM

## 2023-06-13 RX ORDER — HYDROCORTISONE 25 MG/G
CREAM TOPICAL AS NEEDED
Status: DISCONTINUED | OUTPATIENT
Start: 2023-06-13 | End: 2023-06-15 | Stop reason: HOSPADM

## 2023-06-13 RX ORDER — IBUPROFEN 600 MG/1
600 TABLET ORAL EVERY 6 HOURS PRN
Status: DISCONTINUED | OUTPATIENT
Start: 2023-06-13 | End: 2023-06-15 | Stop reason: HOSPADM

## 2023-06-13 RX ORDER — METHYLERGONOVINE MALEATE 0.2 MG/ML
200 INJECTION INTRAVENOUS ONCE AS NEEDED
Status: DISCONTINUED | OUTPATIENT
Start: 2023-06-13 | End: 2023-06-13 | Stop reason: HOSPADM

## 2023-06-13 RX ORDER — METHYLERGONOVINE MALEATE 0.2 MG/ML
200 INJECTION INTRAVENOUS ONCE AS NEEDED
Status: DISCONTINUED | OUTPATIENT
Start: 2023-06-13 | End: 2023-06-15 | Stop reason: HOSPADM

## 2023-06-13 RX ORDER — ONDANSETRON 2 MG/ML
4 INJECTION INTRAMUSCULAR; INTRAVENOUS EVERY 6 HOURS PRN
Status: DISCONTINUED | OUTPATIENT
Start: 2023-06-13 | End: 2023-06-15 | Stop reason: HOSPADM

## 2023-06-13 RX ORDER — ACETAMINOPHEN 325 MG/1
650 TABLET ORAL EVERY 6 HOURS PRN
Status: DISCONTINUED | OUTPATIENT
Start: 2023-06-13 | End: 2023-06-15 | Stop reason: HOSPADM

## 2023-06-13 RX ORDER — PROMETHAZINE HYDROCHLORIDE 25 MG/1
25 TABLET ORAL EVERY 6 HOURS PRN
Status: DISCONTINUED | OUTPATIENT
Start: 2023-06-13 | End: 2023-06-15 | Stop reason: HOSPADM

## 2023-06-13 RX ADMIN — PENICILLIN G 3 MILLION UNITS: 3000000 INJECTION, SOLUTION INTRAVENOUS at 03:41

## 2023-06-13 RX ADMIN — Medication 125 ML/HR: at 06:38

## 2023-06-13 RX ADMIN — Medication 2 MILLI-UNITS/MIN: at 00:40

## 2023-06-13 RX ADMIN — SODIUM CHLORIDE, POTASSIUM CHLORIDE, SODIUM LACTATE AND CALCIUM CHLORIDE 125 ML/HR: 600; 310; 30; 20 INJECTION, SOLUTION INTRAVENOUS at 04:42

## 2023-06-13 RX ADMIN — BUTORPHANOL TARTRATE 1 MG: 1 INJECTION, SOLUTION INTRAMUSCULAR; INTRAVENOUS at 06:45

## 2023-06-13 RX ADMIN — DOCUSATE SODIUM 100 MG: 100 CAPSULE ORAL at 10:53

## 2023-06-13 RX ADMIN — PRENATAL VIT W/ FE FUMARATE-FA TAB 27-0.8 MG 1 TABLET: 27-0.8 TAB at 10:53

## 2023-06-13 NOTE — PLAN OF CARE
Goal Outcome Evaluation:           Progress: improving  Outcome Evaluation: VSS, FF, ML, U1, scant lochia, no c/o pain, voiding, visits NICU

## 2023-06-13 NOTE — PLAN OF CARE
Goal Outcome Evaluation:  Plan of Care Reviewed With: patient, significant other        Progress: improving  Outcome Evaluation: patient delivered at 0607, no lacs, uterus firm/midline/2 below with scant bleeding. Infant in NICU

## 2023-06-13 NOTE — L&D DELIVERY NOTE
[facility]  Vaginal Delivery Note    Delivery details     Delivery: Vaginal, Spontaneous     YOB: 2022    Time of Birth: 6:07 AM      Anesthesia: Epidural     Delivering clinician: Brandy Tellez    Forceps?   No   Vacuum? No    Shoulder dystocia present: No      Delivery narrative:      Infant details    Findings: female  infant     Infant observations: Weight: 2320 g (5 lb 1.8 oz)   Length: 18  in   Apgars: 8  @ 1 minute /    9  @ 5 minutes     Placenta, Cord, and Fluid    Placenta delivered  Spontaneous  at   2023  6:12 AM     Cord: 3 vessels  present.   Nuchal Cord?  no   Cord blood obtained: Yes      Repair    Episiotomy: None    Lacerations: No   Estimated Blood Loss:     Suture used for repair: n/a       Complications  25% abruption    Disposition  Mother to Mother Baby/Postpartum  in stable condition currently.  Baby to NICU  in stable condition currently.      Brandy Tellez MD  23  16:08 CDT

## 2023-06-13 NOTE — LACTATION NOTE
This note was copied from a baby's chart.  Visit with mother in LDR. Mother confirms feeding plan is to formula feed. Open to donor milk if Neonatology recommends. Otherwise wants to formula feed. Does not desire to breastfeed or pump. Affirmed decision. Donor milk consent signed and provided to NICU staff. Non-nursing mother's book and suppression education provided. Questions denied. Encouragement and support provided.

## 2023-06-13 NOTE — PROGRESS NOTES
Rosalia Guajardo  : 2000  MRN: [id number]  CSN: [id number]    Labor progress note    Subjective   Induction today for PPROM.     Objective   Min/max vitals past 24 hours:  Temp  Min: 97.8 °F (36.6 °C)  Max: 98.4 °F (36.9 °C)   BP  Min: 87/39  Max: 153/79   Pulse  Min: 55  Max: 132   Resp  Min: 16  Max: 18        FHT's: reassuring and category 1.  external monitors used   Cervix: was checked (by RN): 2 cm / 70 % / -3   Contractions: none      Assessment   1. IUP at 34w1d  2. PPROM     Plan   1. Continue with induction    Brandy Tellez MD  2023  06:23 CDT

## 2023-06-13 NOTE — CASE MANAGEMENT/SOCIAL WORK
PT requesting gas card and car seat through the parent's with hope fund. PT advised that one gas card per week could be provided during baby's NICU stay and that a carseat could be provided closer to time for dc. Mother not currently involved with HANDS program but agreeable to referral. SW making HANDS referral today, so that HANDS can get started with family prior to dc.

## 2023-06-14 LAB
HCT VFR BLD AUTO: 28.5 % (ref 34–46.6)
HGB BLD-MCNC: 8.4 G/DL (ref 12–15.9)
LAB AP CASE REPORT: NORMAL
Lab: NORMAL
PATH REPORT.FINAL DX SPEC: NORMAL
PATH REPORT.GROSS SPEC: NORMAL

## 2023-06-14 PROCEDURE — 85018 HEMOGLOBIN: CPT | Performed by: OBSTETRICS & GYNECOLOGY

## 2023-06-14 PROCEDURE — 85014 HEMATOCRIT: CPT | Performed by: OBSTETRICS & GYNECOLOGY

## 2023-06-14 RX ADMIN — IBUPROFEN 600 MG: 600 TABLET, FILM COATED ORAL at 20:04

## 2023-06-14 NOTE — PLAN OF CARE
Goal Outcome Evaluation:  Plan of Care Reviewed With: patient        Progress: improving     VSS, FF/M/U3 with scant bleeding, voiding and ambulating, showered, resting between care.

## 2023-06-14 NOTE — PLAN OF CARE
Goal Outcome Evaluation:           Progress: improving  Outcome Evaluation: VSS, FF, ML, U1, scant to no lochia, denies pain, frequently off floor, voiding, showered

## 2023-06-14 NOTE — PROGRESS NOTES
"[facility]  Vaginal Delivery Progress Note    Subjective   Postpartum Day 1: Vaginal Delivery    The patient feels well.  Her pain is well controlled with prescribed pain medications.   She is ambulating well.  Patient describes her bleeding as moderate lochia.    Breastfeeding: declines.    Objective     Vital Signs Range for the last 24 hours  Temperature: Temp:  [97 °F (36.1 °C)-98.8 °F (37.1 °C)] 97.3 °F (36.3 °C)   Temp Source: Temp src: Temporal   BP: BP: ()/(60-87) 123/81   Pulse: Heart Rate:  [60-91] 60   Respirations: Resp:  [16-18] 16   SPO2: SpO2:  [98 %-100 %] 99 %   O2 Amount (l/min):     O2 Devices Device (Oxygen Therapy): room air   Weight:       Admit Height:  Height: 162.6 cm (64\")      Physical Exam:  General:  no acute distresss.  Abdomen: Fundus: appropriate, firm, non tender  Extremities: normal, atraumatic, no cyanosis, and trace edema.     Lab results reviewed:  Yes   Rubella:  No results found for: RUBELLAIGGIN Nurse Transcribed from prenatal record --  No components found for: EXTRUBELQUAL  Rh Status:  No results found for: RH  Immunizations: There is no immunization history for the selected administration types on file for this patient.    Assessment & Plan       * No active hospital problems. *      Lacey Guajardo is Day 1  post-partum  Vaginal, Spontaneous   .      Plan:  Continue current care.      Brandy Tellez MD  6/14/2023  06:36 CDT  "

## 2023-06-15 VITALS
HEIGHT: 64 IN | RESPIRATION RATE: 16 BRPM | BODY MASS INDEX: 26.98 KG/M2 | DIASTOLIC BLOOD PRESSURE: 72 MMHG | TEMPERATURE: 97 F | WEIGHT: 158 LBS | OXYGEN SATURATION: 99 % | SYSTOLIC BLOOD PRESSURE: 120 MMHG | HEART RATE: 89 BPM

## 2023-06-15 RX ORDER — TRAMADOL HYDROCHLORIDE 50 MG/1
50 TABLET ORAL EVERY 6 HOURS PRN
Qty: 10 TABLET | Refills: 0 | Status: SHIPPED | OUTPATIENT
Start: 2023-06-15 | End: 2023-06-20

## 2023-06-15 RX ORDER — IBUPROFEN 600 MG/1
600 TABLET ORAL EVERY 6 HOURS PRN
Qty: 60 TABLET | Refills: 2 | Status: SHIPPED | OUTPATIENT
Start: 2023-06-15

## 2023-06-15 NOTE — NURSING NOTE
This patient attended the Discharge Teaching Class during their stay. Information was taught out of the Postpartum and New Point Care booklet. Topics included in the discharge class are:    - Uterine and lochia changes  - Possible Postpartum Complications  - Perineal and Incisional care including Pelvic Rest  - Postpartum Depression and Anxiety  - Importance of notifying MD if any complications or concerns arise.     Additional topics included in the class related to  Care are:    - Caring for a  including use of bulb syringe  - Changes in  behavior  - Safe sleep and safety precautions  - Shaken Baby Syndrome  - Importance of notifying MD if concerns or complications arise.

## 2023-06-15 NOTE — PLAN OF CARE
Goal Outcome Evaluation:  Plan of Care Reviewed With: patient        Progress: improving  Outcome Evaluation: Continue with POC; VSS; FFMLU3-4 with scant rubra; ambulating in adhikari and visting NB in NICU; motrin given x1 for cramping.

## 2023-06-15 NOTE — CASE MANAGEMENT/SOCIAL WORK
Informed that patient was asking for hotel stay to be covered.  Spoke with Director of [facility] and we will not be providing this. Noted that patient was discharged but she is not currently in room nor in NICU visiting infant.  Could not leave message on her cell number provided as it went straight to  that was full and could not leave message to inform. Will follow.

## 2023-06-15 NOTE — DISCHARGE SUMMARY
Discharge Summary     Rosalia Guajardo  : 2000  MRN: [id number]  CSN: [id number]    Date of Admission: 6/3/2023   Date of Discharge:  6/15/2023   Delivering Physician: Brandy Tellez        Admission Diagnosis: 1. Pregnancy [Z34.90]  2.  premature rupture of membranes (PPROM) delivered, current hospitalization [O42.919]   Discharge Diagnosis: 1. Pregnancy at 34w1d - delivered       Procedures: 2023  - Vaginal, Spontaneous       Hospital Course  Patient is a 22 y.o.  who at 34w1d had a uncomplicated vaginal delivery.  Her postpartum course was without complications.  On PPD #2 she was ready for discharge.  She had normal lochia and pain was well controlled with oral medications.    Infant  female  fetus weighing 2320 g (5 lb 1.8 oz)   Apgars -  8 @ 1 minute /  9 @ 5 minutes.    Discharge labs  Lab Results   Component Value Date    WBC 12.04 (H) 2023    HGB 8.4 (L) 2023    HCT 28.5 (L) 2023     2023       Discharge Medications     Discharge Medications      New Medications      Instructions Start Date   ibuprofen 600 MG tablet  Commonly known as: ADVIL,MOTRIN   600 mg, Oral, Every 6 Hours PRN      traMADol 50 MG tablet  Commonly known as: ULTRAM   50 mg, Oral, Every 6 Hours PRN         Continue These Medications      Instructions Start Date   prenatal vitamin 27-0.8 27-0.8 MG tablet tablet   1 tablet, Oral, Daily             Discharge Disposition Home or Self Care   Condition on Discharge: good   Follow-up: 4 weeks with Dr. Tellez, if no appointment, please call office     Brandy Tellez MD  6/15/2023

## 2023-06-16 NOTE — PAYOR COMM NOTE
"REF:   [id number]     [facility]  RYAN,  [phone]  OR  FAX   [phone]      Loni Guajardo (22 y.o. Female)       Date of Birth   2000    Social Security Number       Address   [address] [place] [address]    Home Phone   [phone]    MRN   [id number]       Synagogue   Other    Marital Status   Single                            Admission Date   6/3/23    Admission Type   Elective    Admitting Provider   Brandy Tellez MD    Attending Provider       Department, Room/Bed   [facility] MOTHER BABY 2A, M242/1       Discharge Date   6/15/2023    Discharge Disposition   Home or Self Care    Discharge Destination                                 Attending Provider: (none)   Allergies: No Known Allergies    Isolation: None   Infection: None   Code Status: Prior    Ht: 162.6 cm (64\")   Wt: 71.7 kg (158 lb)    Admission Cmt: None   Principal Problem: Pregnancy [Z34.90]                   Active Insurance as of 6/3/2023       Primary Coverage       Payor Plan Insurance Group Employer/Plan Group    [place] [id number]       Payor Plan Address Payor Plan Phone Number Payor Plan Fax Number Effective Dates    [employer] [address] [address] [phone]  2021 - None Entered    [facility] [id number]         Subscriber Name Subscriber Birth Date Member ID       LONI GUAJARDO 2000 [id number]                     Emergency Contacts        (Rel.) Home Phone Work Phone Mobile Phone    ANTWAN BONE (Other) [phone] -- --    Venkatesh Bone (Mother) -- -- [phone]                 Discharge Summary        Brandy Tellez MD at 06/15/23 0720          Discharge Summary     Rosalia Guajardo  : 2000  MRN: [id number]  CSN: [id number]    Date of Admission: 6/3/2023   Date of Discharge:  6/15/2023   Delivering Physician: Brandy Tellez        Admission Diagnosis: Pregnancy [Z34.90]   premature " rupture of membranes (PPROM) delivered, current hospitalization [O42.919]   Discharge Diagnosis: Pregnancy at 34w1d - delivered       Procedures: 2023  - Vaginal, Spontaneous       Hospital Course  Patient is a 22 y.o.  who at 34w1d had a uncomplicated vaginal delivery.  Her postpartum course was without complications.  On PPD #2 she was ready for discharge.  She had normal lochia and pain was well controlled with oral medications.    Infant  female  fetus weighing 2320 g (5 lb 1.8 oz)   Apgars -  8 @ 1 minute /  9 @ 5 minutes.    Discharge labs  Lab Results   Component Value Date    WBC 12.04 (H) 2023    HGB 8.4 (L) 2023    HCT 28.5 (L) 2023     2023       Discharge Medications     Discharge Medications        New Medications        Instructions Start Date   ibuprofen 600 MG tablet  Commonly known as: ADVIL,MOTRIN   600 mg, Oral, Every 6 Hours PRN      traMADol 50 MG tablet  Commonly known as: ULTRAM   50 mg, Oral, Every 6 Hours PRN             Continue These Medications        Instructions Start Date   prenatal vitamin 27-0.8 27-0.8 MG tablet tablet   1 tablet, Oral, Daily               Discharge Disposition Home or Self Care   Condition on Discharge: good   Follow-up: 4 weeks with Dr. Tellez, if no appointment, please call office     Brandy Tellez MD  6/15/2023    Electronically signed by Brandy Tellez MD at 06/15/23 0720       Discharge Order (From admission, onward)       Start     Ordered    06/15/23 0718  Discharge patient  Once        Expected Discharge Date: 06/15/23    Discharge Disposition: Home or Self Care    Physician of Record for Attribution - Please select from Treatment Team: BRANDY TELLEZ [[id number]]    Review needed by CMO to determine Physician of Record: No       Question Answer Comment   Physician of Record for Attribution - Please select from Treatment Team BRANDY TELLEZ    Review needed by CMO to determine  Physician of Record No        06/15/23 0719

## 2025-03-28 NOTE — PROGRESS NOTES
Rosalia Guajardo  : 2000  MRN: [id number]  CSN: [id number]    Labor progress note    Subjective   HD#4 with PPROM, states having a little better day. Still no plan from hospital on how to get patient to be able to see her children after she was transferred here, far from home with above. No evidence of PTL or infection     Objective   Min/max vitals past 24 hours:  Temp  Min: 97.4 °F (36.3 °C)  Max: 98.4 °F (36.9 °C)   BP  Min: 102/53  Max: 133/68   Pulse  Min: 69  Max: 97   Resp  Min: 16  Max: 16        FHT's: reassuring and category 1.  external monitors used   Cervix: was not checked.   Contractions: none      Assessment   IUP at 33w1d  PPROM     Plan   Expectant management    Brandy Tellez MD  2023  17:17 CDT      Statement Selected